# Patient Record
Sex: FEMALE | Race: WHITE | NOT HISPANIC OR LATINO | Employment: OTHER | URBAN - METROPOLITAN AREA
[De-identification: names, ages, dates, MRNs, and addresses within clinical notes are randomized per-mention and may not be internally consistent; named-entity substitution may affect disease eponyms.]

---

## 2017-01-05 ENCOUNTER — COMMUNICATION - HEALTHEAST (OUTPATIENT)
Dept: ONCOLOGY | Facility: HOSPITAL | Age: 72
End: 2017-01-05

## 2017-01-09 ENCOUNTER — OFFICE VISIT - HEALTHEAST (OUTPATIENT)
Dept: RADIATION ONCOLOGY | Facility: CLINIC | Age: 72
End: 2017-01-09

## 2017-01-09 DIAGNOSIS — C50.119: ICD-10-CM

## 2017-01-09 ASSESSMENT — MIFFLIN-ST. JEOR: SCORE: 1105.28

## 2017-01-12 ENCOUNTER — COMMUNICATION - HEALTHEAST (OUTPATIENT)
Dept: SURGERY | Facility: CLINIC | Age: 72
End: 2017-01-12

## 2017-01-12 ENCOUNTER — OFFICE VISIT - HEALTHEAST (OUTPATIENT)
Dept: ONCOLOGY | Facility: HOSPITAL | Age: 72
End: 2017-01-12

## 2017-01-12 DIAGNOSIS — C50.119: ICD-10-CM

## 2017-01-12 DIAGNOSIS — M94.9 DISORDER OF BONE AND CARTILAGE: ICD-10-CM

## 2017-01-12 DIAGNOSIS — M89.9 DISORDER OF BONE AND CARTILAGE: ICD-10-CM

## 2017-01-13 ENCOUNTER — COMMUNICATION - HEALTHEAST (OUTPATIENT)
Dept: ONCOLOGY | Facility: HOSPITAL | Age: 72
End: 2017-01-13

## 2017-01-16 ENCOUNTER — OFFICE VISIT - HEALTHEAST (OUTPATIENT)
Dept: RADIATION ONCOLOGY | Facility: CLINIC | Age: 72
End: 2017-01-16

## 2017-01-16 DIAGNOSIS — C50.119: ICD-10-CM

## 2017-01-16 ASSESSMENT — MIFFLIN-ST. JEOR: SCORE: 1103.02

## 2017-01-19 ENCOUNTER — RECORDS - HEALTHEAST (OUTPATIENT)
Dept: ADMINISTRATIVE | Facility: OTHER | Age: 72
End: 2017-01-19

## 2017-01-19 ENCOUNTER — RECORDS - HEALTHEAST (OUTPATIENT)
Dept: BONE DENSITY | Facility: CLINIC | Age: 72
End: 2017-01-19

## 2017-01-19 DIAGNOSIS — M94.9 DISORDER OF CARTILAGE, UNSPECIFIED: ICD-10-CM

## 2017-01-19 DIAGNOSIS — M89.9 DISORDER OF BONE, UNSPECIFIED: ICD-10-CM

## 2017-01-19 DIAGNOSIS — C50.119: ICD-10-CM

## 2017-01-23 ENCOUNTER — OFFICE VISIT - HEALTHEAST (OUTPATIENT)
Dept: RADIATION ONCOLOGY | Facility: CLINIC | Age: 72
End: 2017-01-23

## 2017-01-23 DIAGNOSIS — B02.9 HERPES ZOSTER WITHOUT COMPLICATION: ICD-10-CM

## 2017-01-23 DIAGNOSIS — C50.119: ICD-10-CM

## 2017-01-23 ASSESSMENT — MIFFLIN-ST. JEOR: SCORE: 1098.03

## 2017-01-24 ENCOUNTER — COMMUNICATION - HEALTHEAST (OUTPATIENT)
Dept: ONCOLOGY | Facility: HOSPITAL | Age: 72
End: 2017-01-24

## 2017-01-26 ENCOUNTER — AMBULATORY - HEALTHEAST (OUTPATIENT)
Dept: RADIATION ONCOLOGY | Facility: CLINIC | Age: 72
End: 2017-01-26

## 2017-01-30 ENCOUNTER — OFFICE VISIT - HEALTHEAST (OUTPATIENT)
Dept: RADIATION ONCOLOGY | Facility: CLINIC | Age: 72
End: 2017-01-30

## 2017-01-30 DIAGNOSIS — C50.119: ICD-10-CM

## 2017-01-30 ASSESSMENT — MIFFLIN-ST. JEOR: SCORE: 1100.29

## 2017-02-03 ENCOUNTER — COMMUNICATION - HEALTHEAST (OUTPATIENT)
Dept: ONCOLOGY | Facility: HOSPITAL | Age: 72
End: 2017-02-03

## 2017-02-06 ENCOUNTER — OFFICE VISIT - HEALTHEAST (OUTPATIENT)
Dept: RADIATION ONCOLOGY | Facility: CLINIC | Age: 72
End: 2017-02-06

## 2017-02-06 DIAGNOSIS — C50.119: ICD-10-CM

## 2017-02-07 ENCOUNTER — COMMUNICATION - HEALTHEAST (OUTPATIENT)
Dept: INFUSION THERAPY | Facility: HOSPITAL | Age: 72
End: 2017-02-07

## 2017-02-13 ENCOUNTER — OFFICE VISIT - HEALTHEAST (OUTPATIENT)
Dept: RADIATION ONCOLOGY | Facility: CLINIC | Age: 72
End: 2017-02-13

## 2017-02-13 DIAGNOSIS — C50.119: ICD-10-CM

## 2017-02-13 ASSESSMENT — MIFFLIN-ST. JEOR: SCORE: 1105.74

## 2017-02-17 ENCOUNTER — AMBULATORY - HEALTHEAST (OUTPATIENT)
Dept: RADIATION ONCOLOGY | Facility: CLINIC | Age: 72
End: 2017-02-17

## 2017-02-28 ENCOUNTER — COMMUNICATION - HEALTHEAST (OUTPATIENT)
Dept: RADIATION ONCOLOGY | Facility: CLINIC | Age: 72
End: 2017-02-28

## 2017-03-13 ENCOUNTER — OFFICE VISIT - HEALTHEAST (OUTPATIENT)
Dept: RADIATION ONCOLOGY | Facility: CLINIC | Age: 72
End: 2017-03-13

## 2017-03-13 ENCOUNTER — TRANSFERRED RECORDS (OUTPATIENT)
Dept: HEALTH INFORMATION MANAGEMENT | Facility: CLINIC | Age: 72
End: 2017-03-13

## 2017-03-13 DIAGNOSIS — C50.119: ICD-10-CM

## 2017-03-13 RX ORDER — TURMERIC 95 %
1000 POWDER (GRAM) MISCELLANEOUS DAILY
Status: SHIPPED | COMMUNITY
Start: 2017-03-13

## 2017-03-14 ENCOUNTER — AMBULATORY - HEALTHEAST (OUTPATIENT)
Dept: RADIATION ONCOLOGY | Facility: CLINIC | Age: 72
End: 2017-03-14

## 2017-03-14 DIAGNOSIS — C50.119: ICD-10-CM

## 2017-03-16 ENCOUNTER — AMBULATORY - HEALTHEAST (OUTPATIENT)
Dept: INFUSION THERAPY | Facility: HOSPITAL | Age: 72
End: 2017-03-16

## 2017-03-16 ENCOUNTER — OFFICE VISIT - HEALTHEAST (OUTPATIENT)
Dept: ONCOLOGY | Facility: HOSPITAL | Age: 72
End: 2017-03-16

## 2017-03-16 DIAGNOSIS — C50.119: ICD-10-CM

## 2017-03-21 ENCOUNTER — COMMUNICATION - HEALTHEAST (OUTPATIENT)
Dept: ONCOLOGY | Facility: HOSPITAL | Age: 72
End: 2017-03-21

## 2017-03-23 ENCOUNTER — COMMUNICATION - HEALTHEAST (OUTPATIENT)
Dept: ONCOLOGY | Facility: CLINIC | Age: 72
End: 2017-03-23

## 2017-06-15 ENCOUNTER — AMBULATORY - HEALTHEAST (OUTPATIENT)
Dept: ONCOLOGY | Facility: HOSPITAL | Age: 72
End: 2017-06-15

## 2017-06-15 ENCOUNTER — OFFICE VISIT - HEALTHEAST (OUTPATIENT)
Dept: ONCOLOGY | Facility: HOSPITAL | Age: 72
End: 2017-06-15

## 2017-06-15 DIAGNOSIS — C50.119: ICD-10-CM

## 2017-06-15 DIAGNOSIS — M94.9 DISORDER OF BONE AND CARTILAGE: ICD-10-CM

## 2017-06-15 DIAGNOSIS — M89.9 DISORDER OF BONE AND CARTILAGE: ICD-10-CM

## 2017-07-05 ENCOUNTER — COMMUNICATION - HEALTHEAST (OUTPATIENT)
Dept: ONCOLOGY | Facility: CLINIC | Age: 72
End: 2017-07-05

## 2017-08-11 ENCOUNTER — COMMUNICATION - HEALTHEAST (OUTPATIENT)
Dept: ADMINISTRATIVE | Facility: HOSPITAL | Age: 72
End: 2017-08-11

## 2017-08-12 ENCOUNTER — AMBULATORY - RIVER FALLS (OUTPATIENT)
Dept: FAMILY MEDICINE | Facility: CLINIC | Age: 72
End: 2017-08-12

## 2017-08-13 LAB
CHOLEST SERPL-MCNC: 282 MG/DL (ref 125–200)
CHOLEST/HDLC SERPL: 2.4 {RATIO}
CREAT SERPL-MCNC: 0.73 MG/DL (ref 0.6–0.93)
GLUCOSE BLD-MCNC: 102 MG/DL (ref 65–99)
HBA1C MFR BLD: 5.6 %
HDLC SERPL-MCNC: 117 MG/DL
LDLC SERPL CALC-MCNC: 148 MG/DL
NONHDLC SERPL-MCNC: 165 MG/DL
TRIGL SERPL-MCNC: 84 MG/DL

## 2017-08-17 ENCOUNTER — OFFICE VISIT - RIVER FALLS (OUTPATIENT)
Dept: FAMILY MEDICINE | Facility: CLINIC | Age: 72
End: 2017-08-17

## 2017-08-17 ASSESSMENT — MIFFLIN-ST. JEOR: SCORE: 1114.12

## 2017-08-24 ENCOUNTER — AMBULATORY - RIVER FALLS (OUTPATIENT)
Dept: FAMILY MEDICINE | Facility: CLINIC | Age: 72
End: 2017-08-24

## 2017-09-11 ENCOUNTER — OFFICE VISIT - HEALTHEAST (OUTPATIENT)
Dept: ONCOLOGY | Facility: HOSPITAL | Age: 72
End: 2017-09-11

## 2017-09-11 ENCOUNTER — AMBULATORY - HEALTHEAST (OUTPATIENT)
Dept: INFUSION THERAPY | Facility: HOSPITAL | Age: 72
End: 2017-09-11

## 2017-09-11 DIAGNOSIS — M89.9 DISORDER OF BONE AND CARTILAGE: ICD-10-CM

## 2017-09-11 DIAGNOSIS — C50.119: ICD-10-CM

## 2017-09-11 DIAGNOSIS — M94.9 DISORDER OF BONE AND CARTILAGE: ICD-10-CM

## 2017-11-21 ENCOUNTER — HOSPITAL ENCOUNTER (OUTPATIENT)
Dept: MAMMOGRAPHY | Facility: CLINIC | Age: 72
Discharge: HOME | End: 2017-11-21
Attending: SPECIALIST

## 2017-11-21 DIAGNOSIS — Z85.3 PERSONAL HISTORY OF BREAST CANCER: ICD-10-CM

## 2017-12-26 ENCOUNTER — OFFICE VISIT - HEALTHEAST (OUTPATIENT)
Dept: SURGERY | Facility: CLINIC | Age: 72
End: 2017-12-26

## 2017-12-26 DIAGNOSIS — Z85.3 PERSONAL HISTORY OF BREAST CANCER: ICD-10-CM

## 2017-12-26 ASSESSMENT — MIFFLIN-ST. JEOR: SCORE: 1104.83

## 2018-01-11 ENCOUNTER — COMMUNICATION - HEALTHEAST (OUTPATIENT)
Dept: ONCOLOGY | Facility: HOSPITAL | Age: 73
End: 2018-01-11

## 2018-01-11 ENCOUNTER — OFFICE VISIT - HEALTHEAST (OUTPATIENT)
Dept: ONCOLOGY | Facility: HOSPITAL | Age: 73
End: 2018-01-11

## 2018-01-11 DIAGNOSIS — Z17.0 MALIGNANT NEOPLASM OF LOWER-INNER QUADRANT OF LEFT BREAST IN FEMALE, ESTROGEN RECEPTOR POSITIVE (H): ICD-10-CM

## 2018-01-11 DIAGNOSIS — C50.312 MALIGNANT NEOPLASM OF LOWER-INNER QUADRANT OF LEFT BREAST IN FEMALE, ESTROGEN RECEPTOR POSITIVE (H): ICD-10-CM

## 2018-02-03 ENCOUNTER — COMMUNICATION - HEALTHEAST (OUTPATIENT)
Dept: ONCOLOGY | Facility: HOSPITAL | Age: 73
End: 2018-02-03

## 2018-05-02 ENCOUNTER — OFFICE VISIT - HEALTHEAST (OUTPATIENT)
Dept: ONCOLOGY | Facility: HOSPITAL | Age: 73
End: 2018-05-02

## 2018-05-02 ENCOUNTER — AMBULATORY - HEALTHEAST (OUTPATIENT)
Dept: ONCOLOGY | Facility: HOSPITAL | Age: 73
End: 2018-05-02

## 2018-05-02 DIAGNOSIS — Z17.0 MALIGNANT NEOPLASM OF LOWER-INNER QUADRANT OF LEFT BREAST IN FEMALE, ESTROGEN RECEPTOR POSITIVE (H): ICD-10-CM

## 2018-05-02 DIAGNOSIS — C50.312 MALIGNANT NEOPLASM OF LOWER-INNER QUADRANT OF LEFT BREAST IN FEMALE, ESTROGEN RECEPTOR POSITIVE (H): ICD-10-CM

## 2018-05-02 DIAGNOSIS — M94.9 DISORDER OF BONE AND CARTILAGE: ICD-10-CM

## 2018-05-02 DIAGNOSIS — M89.9 DISORDER OF BONE AND CARTILAGE: ICD-10-CM

## 2018-05-22 ENCOUNTER — OFFICE VISIT - RIVER FALLS (OUTPATIENT)
Dept: FAMILY MEDICINE | Facility: CLINIC | Age: 73
End: 2018-05-22

## 2018-10-03 ENCOUNTER — OFFICE VISIT - HEALTHEAST (OUTPATIENT)
Dept: ONCOLOGY | Facility: HOSPITAL | Age: 73
End: 2018-10-03

## 2018-10-03 ENCOUNTER — AMBULATORY - HEALTHEAST (OUTPATIENT)
Dept: INFUSION THERAPY | Facility: HOSPITAL | Age: 73
End: 2018-10-03

## 2018-10-03 DIAGNOSIS — M89.9 DISORDER OF BONE AND CARTILAGE: ICD-10-CM

## 2018-10-03 DIAGNOSIS — Z17.0 MALIGNANT NEOPLASM OF LOWER-INNER QUADRANT OF LEFT BREAST IN FEMALE, ESTROGEN RECEPTOR POSITIVE (H): ICD-10-CM

## 2018-10-03 DIAGNOSIS — M94.9 DISORDER OF BONE AND CARTILAGE: ICD-10-CM

## 2018-10-03 DIAGNOSIS — C50.312 MALIGNANT NEOPLASM OF LOWER-INNER QUADRANT OF LEFT BREAST IN FEMALE, ESTROGEN RECEPTOR POSITIVE (H): ICD-10-CM

## 2018-10-03 DIAGNOSIS — Z79.811 LONG TERM CURRENT USE OF AROMATASE INHIBITOR: ICD-10-CM

## 2018-10-03 LAB
ALBUMIN SERPL-MCNC: 3.9 G/DL (ref 3.5–5)
ALP SERPL-CCNC: 83 U/L (ref 45–120)
ALT SERPL W P-5'-P-CCNC: 18 U/L (ref 0–45)
ANION GAP SERPL CALCULATED.3IONS-SCNC: 7 MMOL/L (ref 5–18)
AST SERPL W P-5'-P-CCNC: 22 U/L (ref 0–40)
BILIRUB SERPL-MCNC: 0.6 MG/DL (ref 0–1)
BUN SERPL-MCNC: 12 MG/DL (ref 8–28)
CALCIUM SERPL-MCNC: 9.6 MG/DL (ref 8.5–10.5)
CHLORIDE BLD-SCNC: 104 MMOL/L (ref 98–107)
CO2 SERPL-SCNC: 30 MMOL/L (ref 22–31)
CREAT SERPL-MCNC: 0.72 MG/DL (ref 0.6–1.1)
GFR SERPL CREATININE-BSD FRML MDRD: >60 ML/MIN/1.73M2
GLUCOSE BLD-MCNC: 99 MG/DL (ref 70–125)
POTASSIUM BLD-SCNC: 4.2 MMOL/L (ref 3.5–5)
PROT SERPL-MCNC: 6.5 G/DL (ref 6–8)
SODIUM SERPL-SCNC: 141 MMOL/L (ref 136–145)

## 2018-12-05 ENCOUNTER — HOSPITAL ENCOUNTER (OUTPATIENT)
Dept: MAMMOGRAPHY | Facility: CLINIC | Age: 73
Discharge: HOME | End: 2018-12-05
Attending: INTERNAL MEDICINE

## 2018-12-05 DIAGNOSIS — Z12.31 VISIT FOR SCREENING MAMMOGRAM: ICD-10-CM

## 2019-01-31 ENCOUNTER — COMMUNICATION - HEALTHEAST (OUTPATIENT)
Dept: ONCOLOGY | Facility: HOSPITAL | Age: 74
End: 2019-01-31

## 2019-03-13 ENCOUNTER — COMMUNICATION - HEALTHEAST (OUTPATIENT)
Dept: ONCOLOGY | Facility: HOSPITAL | Age: 74
End: 2019-03-13

## 2019-03-13 ENCOUNTER — COMMUNICATION - HEALTHEAST (OUTPATIENT)
Dept: ADMINISTRATIVE | Facility: HOSPITAL | Age: 74
End: 2019-03-13

## 2019-04-17 ENCOUNTER — OFFICE VISIT - HEALTHEAST (OUTPATIENT)
Dept: ONCOLOGY | Facility: HOSPITAL | Age: 74
End: 2019-04-17

## 2019-04-17 ENCOUNTER — AMBULATORY - HEALTHEAST (OUTPATIENT)
Dept: INFUSION THERAPY | Facility: HOSPITAL | Age: 74
End: 2019-04-17

## 2019-04-17 DIAGNOSIS — C50.312 MALIGNANT NEOPLASM OF LOWER-INNER QUADRANT OF LEFT BREAST IN FEMALE, ESTROGEN RECEPTOR POSITIVE (H): ICD-10-CM

## 2019-04-17 DIAGNOSIS — Z17.0 MALIGNANT NEOPLASM OF LOWER-INNER QUADRANT OF LEFT BREAST IN FEMALE, ESTROGEN RECEPTOR POSITIVE (H): ICD-10-CM

## 2019-04-17 LAB
ALBUMIN SERPL-MCNC: 4 G/DL (ref 3.5–5)
ALP SERPL-CCNC: 140 U/L (ref 45–120)
ALT SERPL W P-5'-P-CCNC: 16 U/L (ref 0–45)
ANION GAP SERPL CALCULATED.3IONS-SCNC: 8 MMOL/L (ref 5–18)
AST SERPL W P-5'-P-CCNC: 21 U/L (ref 0–40)
BASOPHILS # BLD AUTO: 0.1 THOU/UL (ref 0–0.2)
BASOPHILS NFR BLD AUTO: 1 % (ref 0–2)
BILIRUB SERPL-MCNC: 0.4 MG/DL (ref 0–1)
BUN SERPL-MCNC: 13 MG/DL (ref 8–28)
CALCIUM SERPL-MCNC: 9.7 MG/DL (ref 8.5–10.5)
CHLORIDE BLD-SCNC: 105 MMOL/L (ref 98–107)
CO2 SERPL-SCNC: 28 MMOL/L (ref 22–31)
CREAT SERPL-MCNC: 0.72 MG/DL (ref 0.6–1.1)
EOSINOPHIL # BLD AUTO: 0.2 THOU/UL (ref 0–0.4)
EOSINOPHIL NFR BLD AUTO: 3 % (ref 0–6)
ERYTHROCYTE [DISTWIDTH] IN BLOOD BY AUTOMATED COUNT: 12.4 % (ref 11–14.5)
GFR SERPL CREATININE-BSD FRML MDRD: >60 ML/MIN/1.73M2
GLUCOSE BLD-MCNC: 139 MG/DL (ref 70–125)
HCT VFR BLD AUTO: 41.8 % (ref 35–47)
HGB BLD-MCNC: 13.6 G/DL (ref 12–16)
LDH SERPL L TO P-CCNC: 174 U/L (ref 125–220)
LYMPHOCYTES # BLD AUTO: 1 THOU/UL (ref 0.8–4.4)
LYMPHOCYTES NFR BLD AUTO: 15 % (ref 20–40)
MCH RBC QN AUTO: 29.2 PG (ref 27–34)
MCHC RBC AUTO-ENTMCNC: 32.5 G/DL (ref 32–36)
MCV RBC AUTO: 90 FL (ref 80–100)
MONOCYTES # BLD AUTO: 0.5 THOU/UL (ref 0–0.9)
MONOCYTES NFR BLD AUTO: 8 % (ref 2–10)
NEUTROPHILS # BLD AUTO: 4.5 THOU/UL (ref 2–7.7)
NEUTROPHILS NFR BLD AUTO: 73 % (ref 50–70)
PLATELET # BLD AUTO: 245 THOU/UL (ref 140–440)
PMV BLD AUTO: 9 FL (ref 8.5–12.5)
POTASSIUM BLD-SCNC: 3.7 MMOL/L (ref 3.5–5)
PROT SERPL-MCNC: 6.7 G/DL (ref 6–8)
RBC # BLD AUTO: 4.65 MILL/UL (ref 3.8–5.4)
SODIUM SERPL-SCNC: 141 MMOL/L (ref 136–145)
WBC: 6.2 THOU/UL (ref 4–11)

## 2019-10-09 ENCOUNTER — OFFICE VISIT - HEALTHEAST (OUTPATIENT)
Dept: ONCOLOGY | Facility: HOSPITAL | Age: 74
End: 2019-10-09

## 2019-10-09 ENCOUNTER — AMBULATORY - HEALTHEAST (OUTPATIENT)
Dept: INFUSION THERAPY | Facility: HOSPITAL | Age: 74
End: 2019-10-09

## 2019-10-09 DIAGNOSIS — C50.312 MALIGNANT NEOPLASM OF LOWER-INNER QUADRANT OF LEFT BREAST IN FEMALE, ESTROGEN RECEPTOR POSITIVE (H): ICD-10-CM

## 2019-10-09 DIAGNOSIS — Z17.0 MALIGNANT NEOPLASM OF LOWER-INNER QUADRANT OF LEFT BREAST IN FEMALE, ESTROGEN RECEPTOR POSITIVE (H): ICD-10-CM

## 2019-10-09 LAB
ALBUMIN SERPL-MCNC: 4 G/DL (ref 3.5–5)
ALP SERPL-CCNC: 86 U/L (ref 45–120)
ALT SERPL W P-5'-P-CCNC: 19 U/L (ref 0–45)
ANION GAP SERPL CALCULATED.3IONS-SCNC: 8 MMOL/L (ref 5–18)
AST SERPL W P-5'-P-CCNC: 25 U/L (ref 0–40)
BILIRUB SERPL-MCNC: 0.5 MG/DL (ref 0–1)
BUN SERPL-MCNC: 10 MG/DL (ref 8–28)
CALCIUM SERPL-MCNC: 9.4 MG/DL (ref 8.5–10.5)
CHLORIDE BLD-SCNC: 104 MMOL/L (ref 98–107)
CO2 SERPL-SCNC: 29 MMOL/L (ref 22–31)
CREAT SERPL-MCNC: 0.68 MG/DL (ref 0.6–1.1)
GFR SERPL CREATININE-BSD FRML MDRD: >60 ML/MIN/1.73M2
GLUCOSE BLD-MCNC: 101 MG/DL (ref 70–125)
POTASSIUM BLD-SCNC: 4.2 MMOL/L (ref 3.5–5)
PROT SERPL-MCNC: 6.7 G/DL (ref 6–8)
SODIUM SERPL-SCNC: 141 MMOL/L (ref 136–145)

## 2020-01-28 ENCOUNTER — COMMUNICATION - HEALTHEAST (OUTPATIENT)
Dept: ONCOLOGY | Facility: HOSPITAL | Age: 75
End: 2020-01-28

## 2020-01-28 DIAGNOSIS — Z17.0 MALIGNANT NEOPLASM OF LOWER-INNER QUADRANT OF LEFT BREAST IN FEMALE, ESTROGEN RECEPTOR POSITIVE (H): ICD-10-CM

## 2020-01-28 DIAGNOSIS — C50.312 MALIGNANT NEOPLASM OF LOWER-INNER QUADRANT OF LEFT BREAST IN FEMALE, ESTROGEN RECEPTOR POSITIVE (H): ICD-10-CM

## 2020-01-30 ENCOUNTER — HOSPITAL ENCOUNTER (OUTPATIENT)
Dept: MAMMOGRAPHY | Facility: CLINIC | Age: 75
Discharge: HOME | End: 2020-01-30
Attending: INTERNAL MEDICINE

## 2020-01-30 ENCOUNTER — COMMUNICATION - HEALTHEAST (OUTPATIENT)
Dept: ONCOLOGY | Facility: HOSPITAL | Age: 75
End: 2020-01-30

## 2020-01-30 DIAGNOSIS — Z12.31 VISIT FOR SCREENING MAMMOGRAM: ICD-10-CM

## 2020-03-03 ENCOUNTER — TELEPHONE (OUTPATIENT)
Dept: OTOLARYNGOLOGY | Facility: CLINIC | Age: 75
End: 2020-03-03

## 2020-03-03 NOTE — TELEPHONE ENCOUNTER
Called patient and left a VM.    Informed her that she will be seeing Dr. Nissen instead of Rupa Edwards at 9/01 appointment (appointment time is still the same), as Rupa will no longer be with our clinic.    Also explained that, while Rupa does not require a referral - Dr. Nissen DOES, and that she will need to obtain one from her PCP prior to this appointment. Provided ENT fax #.    Also provided ENT call center number to call back if questions arise.

## 2020-03-12 ENCOUNTER — TELEPHONE (OUTPATIENT)
Dept: OTOLARYNGOLOGY | Facility: CLINIC | Age: 75
End: 2020-03-12

## 2020-03-12 NOTE — TELEPHONE ENCOUNTER
[facility] Call Center    Phone Message    May a detailed message be left on voicemail: yes     Reason for Call: Other: Patient called to see if referral was received from Dr. Mariajose Hebert at [facility].  It was faxed to [phone].  Please follow up with patient.  Thank you!     Action Taken: Message routed to:  [facility] P ENT [facility]    Travel Screening: Not Applicable

## 2020-03-12 NOTE — TELEPHONE ENCOUNTER
Informed patient that we have not received this referral. Advised her to contact referring clinic and ask them to resend the fax. Patient is in agreement with this plan.    Jes Rebolledo, EMT

## 2020-03-19 ENCOUNTER — TRANSCRIBE ORDERS (OUTPATIENT)
Dept: OTHER | Age: 75
End: 2020-03-19

## 2020-03-19 DIAGNOSIS — H91.90 HEARING LOSS, UNSPECIFIED HEARING LOSS TYPE, UNSPECIFIED LATERALITY: Primary | ICD-10-CM

## 2020-04-01 ENCOUNTER — COMMUNICATION - HEALTHEAST (OUTPATIENT)
Dept: ONCOLOGY | Facility: HOSPITAL | Age: 75
End: 2020-04-01

## 2020-04-06 ENCOUNTER — AMBULATORY - HEALTHEAST (OUTPATIENT)
Dept: INFUSION THERAPY | Facility: HOSPITAL | Age: 75
End: 2020-04-06

## 2020-04-06 DIAGNOSIS — Z17.0 MALIGNANT NEOPLASM OF LOWER-INNER QUADRANT OF LEFT BREAST IN FEMALE, ESTROGEN RECEPTOR POSITIVE (H): ICD-10-CM

## 2020-04-06 DIAGNOSIS — C50.312 MALIGNANT NEOPLASM OF LOWER-INNER QUADRANT OF LEFT BREAST IN FEMALE, ESTROGEN RECEPTOR POSITIVE (H): ICD-10-CM

## 2020-04-06 LAB
ALBUMIN SERPL-MCNC: 4 G/DL (ref 3.5–5)
ALP SERPL-CCNC: 70 U/L (ref 45–120)
ALT SERPL W P-5'-P-CCNC: 17 U/L (ref 0–45)
ANION GAP SERPL CALCULATED.3IONS-SCNC: 8 MMOL/L (ref 5–18)
AST SERPL W P-5'-P-CCNC: 23 U/L (ref 0–40)
BILIRUB SERPL-MCNC: 0.9 MG/DL (ref 0–1)
BUN SERPL-MCNC: 11 MG/DL (ref 8–28)
CALCIUM SERPL-MCNC: 9.5 MG/DL (ref 8.5–10.5)
CHLORIDE BLD-SCNC: 104 MMOL/L (ref 98–107)
CO2 SERPL-SCNC: 29 MMOL/L (ref 22–31)
CREAT SERPL-MCNC: 0.76 MG/DL (ref 0.6–1.1)
GFR SERPL CREATININE-BSD FRML MDRD: >60 ML/MIN/1.73M2
GLUCOSE BLD-MCNC: 93 MG/DL (ref 70–125)
POTASSIUM BLD-SCNC: 4.2 MMOL/L (ref 3.5–5)
PROT SERPL-MCNC: 6.7 G/DL (ref 6–8)
SODIUM SERPL-SCNC: 141 MMOL/L (ref 136–145)

## 2020-04-08 ENCOUNTER — OFFICE VISIT - HEALTHEAST (OUTPATIENT)
Dept: ONCOLOGY | Facility: HOSPITAL | Age: 75
End: 2020-04-08

## 2020-04-08 DIAGNOSIS — C50.312 MALIGNANT NEOPLASM OF LOWER-INNER QUADRANT OF LEFT BREAST IN FEMALE, ESTROGEN RECEPTOR POSITIVE (H): ICD-10-CM

## 2020-04-08 DIAGNOSIS — M89.9 DISORDER OF BONE AND CARTILAGE: ICD-10-CM

## 2020-04-08 DIAGNOSIS — Z17.0 MALIGNANT NEOPLASM OF LOWER-INNER QUADRANT OF LEFT BREAST IN FEMALE, ESTROGEN RECEPTOR POSITIVE (H): ICD-10-CM

## 2020-04-08 DIAGNOSIS — M94.9 DISORDER OF BONE AND CARTILAGE: ICD-10-CM

## 2020-07-14 ENCOUNTER — DOCUMENTATION ONLY (OUTPATIENT)
Dept: CARE COORDINATION | Facility: CLINIC | Age: 75
End: 2020-07-14

## 2020-08-12 NOTE — TELEPHONE ENCOUNTER
FUTURE VISIT INFORMATION      FUTURE VISIT INFORMATION:    Date: 9/1/20    Time: 10:30 AM; Audio 10 AM    Location: [facility]  REFERRAL INFORMATION:    Referring provider:  Dr. Mariajose Hebert    Referring providers clinic:  [facility]    Reason for visit/diagnosis: Ear Cleaning    RECORDS REQUESTED FROM:       Clinic name Comments Records Status Imaging Status   [facility] 3/20/20 - [facility] TV with Dr. Hebert  1/13/20 - [facility] OV with Dr. Suggs [facility] Everywhere

## 2020-08-26 ENCOUNTER — TELEPHONE (OUTPATIENT)
Dept: OTOLARYNGOLOGY | Facility: CLINIC | Age: 75
End: 2020-08-26

## 2020-08-26 NOTE — TELEPHONE ENCOUNTER
LVM to reschedule to video visit,      If pt would like to do telephone visit for 9/1 appt please confirm best number for contact. If pt would like video visit please confirm e-mail and sign them up for mychart as we will sent set up instructions though that.     Dr. Nissen will not be back into clinic until after 9/8

## 2020-09-01 ENCOUNTER — PRE VISIT (OUTPATIENT)
Dept: OTOLARYNGOLOGY | Facility: CLINIC | Age: 75
End: 2020-09-01

## 2021-02-15 ENCOUNTER — OFFICE VISIT - HEALTHEAST (OUTPATIENT)
Dept: ONCOLOGY | Facility: HOSPITAL | Age: 76
End: 2021-02-15

## 2021-02-15 ENCOUNTER — COMMUNICATION - HEALTHEAST (OUTPATIENT)
Dept: ONCOLOGY | Facility: HOSPITAL | Age: 76
End: 2021-02-15

## 2021-02-15 DIAGNOSIS — Z17.0 MALIGNANT NEOPLASM OF LOWER-INNER QUADRANT OF LEFT BREAST IN FEMALE, ESTROGEN RECEPTOR POSITIVE (H): ICD-10-CM

## 2021-02-15 DIAGNOSIS — Z79.811 AROMATASE INHIBITOR USE: ICD-10-CM

## 2021-02-15 DIAGNOSIS — Z78.0 ASYMPTOMATIC MENOPAUSAL STATE: ICD-10-CM

## 2021-02-15 DIAGNOSIS — M94.9 DISORDER OF BONE AND CARTILAGE: ICD-10-CM

## 2021-02-15 DIAGNOSIS — M89.9 DISORDER OF BONE AND CARTILAGE: ICD-10-CM

## 2021-02-15 DIAGNOSIS — C50.312 MALIGNANT NEOPLASM OF LOWER-INNER QUADRANT OF LEFT BREAST IN FEMALE, ESTROGEN RECEPTOR POSITIVE (H): ICD-10-CM

## 2021-02-15 RX ORDER — ANASTROZOLE 1 MG/1
1 TABLET ORAL DAILY
Qty: 90 TABLET | Refills: 3 | Status: SHIPPED | OUTPATIENT
Start: 2021-02-15

## 2021-03-12 ENCOUNTER — RECORDS - HEALTHEAST (OUTPATIENT)
Dept: BONE DENSITY | Facility: CLINIC | Age: 76
End: 2021-03-12

## 2021-03-12 ENCOUNTER — RECORDS - HEALTHEAST (OUTPATIENT)
Dept: ADMINISTRATIVE | Facility: OTHER | Age: 76
End: 2021-03-12

## 2021-03-12 ENCOUNTER — HOSPITAL ENCOUNTER (OUTPATIENT)
Dept: MAMMOGRAPHY | Facility: CLINIC | Age: 76
Discharge: HOME | End: 2021-03-12

## 2021-03-12 DIAGNOSIS — Z78.0 ASYMPTOMATIC MENOPAUSAL STATE: ICD-10-CM

## 2021-03-12 DIAGNOSIS — C50.312 MALIGNANT NEOPLASM OF LOWER-INNER QUADRANT OF LEFT FEMALE BREAST (H): ICD-10-CM

## 2021-03-12 DIAGNOSIS — M89.9 DISORDER OF BONE, UNSPECIFIED: ICD-10-CM

## 2021-03-12 DIAGNOSIS — M94.9 DISORDER OF CARTILAGE, UNSPECIFIED: ICD-10-CM

## 2021-03-12 DIAGNOSIS — Z17.0 ESTROGEN RECEPTOR POSITIVE STATUS (ER+): ICD-10-CM

## 2021-03-12 DIAGNOSIS — Z12.31 VISIT FOR SCREENING MAMMOGRAM: ICD-10-CM

## 2021-03-12 DIAGNOSIS — Z79.811 LONG TERM (CURRENT) USE OF AROMATASE INHIBITORS: ICD-10-CM

## 2021-03-16 ENCOUNTER — COMMUNICATION - HEALTHEAST (OUTPATIENT)
Dept: ONCOLOGY | Facility: HOSPITAL | Age: 76
End: 2021-03-16

## 2021-03-18 ENCOUNTER — COMMUNICATION - HEALTHEAST (OUTPATIENT)
Dept: ONCOLOGY | Facility: HOSPITAL | Age: 76
End: 2021-03-18

## 2021-04-05 ENCOUNTER — COMMUNICATION - HEALTHEAST (OUTPATIENT)
Dept: ONCOLOGY | Facility: HOSPITAL | Age: 76
End: 2021-04-05

## 2021-05-25 ENCOUNTER — RECORDS - HEALTHEAST (OUTPATIENT)
Dept: ADMINISTRATIVE | Facility: CLINIC | Age: 76
End: 2021-05-25

## 2021-05-26 ENCOUNTER — RECORDS - HEALTHEAST (OUTPATIENT)
Dept: ADMINISTRATIVE | Facility: CLINIC | Age: 76
End: 2021-05-26

## 2021-05-27 ENCOUNTER — RECORDS - HEALTHEAST (OUTPATIENT)
Dept: ADMINISTRATIVE | Facility: CLINIC | Age: 76
End: 2021-05-27

## 2021-05-27 NOTE — PROGRESS NOTES
[facility] Hematology/Oncology Inpatient progress note  Patient: Debra Mckeon  MRN: [id number]  Date of Service: 2019       Chief complaint      1. Malignant neoplasm of lower-inner quadrant of left breast in female, estrogen receptor positive (H)          Assessment     1. CA breasts left-sided stage II, T2 N1 M0 ER positive [place] positive HER-2/khari negative by IHC. Negative margins. One sentinel lymph node positive out of 2. This is a grade 1 tumor. Oncotype score of 6. Finished radiation and started Anastrozole in 2017. Tolerating it well.  2. Osteopenia on DEXA. She is on vit D and calcium.  3. Her mother was diagnosed with breast cancer at the same age and  from metastatic breast cancer a few years later. This has been her worry.  4. Use of some alternative medications for health promotion in preservation.  5. History fracture of right 8th rib after a fall.  6. Recently had kidney stone in 2018  7. Slightly elevated Alk Phos.    Plan     1. Continue Anastrozole.  Follow-up with me in 6 months.  2. Continue with good diet and exercise.  3. Increase calcium but mostly in diet and Vit d in her diet.  4. Arm sleeve when travelling.  5. Mammogram in December this year.  6. Bone density to be rechecked in 2021.    HPI    Debra Mckeon is a 74 y.o. old female breast cancer located in her left breast measuring 2.2 cm in size presenting on the CT scan that was done to look for pulmonary embolism back in 2016 when she presented with pleuritic chest pain. She was then seen by Dr. Larios who ordered an Onco blot test which apparently was positive. She then had another mammogram in 2016 which was also suggestive of malignancy in the left breast. She underwent biopsy on 2016 which confirmed breast cancer ER positive AZ positive HER-2/khari negative. Subsequent to that she was seen by Dr. Powers who performed lumpectomy with sentinel lymph node biopsy on  December 8, 2016 which showed invasive ductal carcinoma 2.2 cm in size negative margins with one of the 2 sentinel lymph nodes positive for metastatic cancer the size in the lymph node was 9 mm. She recovered well from surgery.     We did Oncotype and it came back low at 6. So she has proceeded with radiation that was finished in Feb 17, 2017. Started on Anastrozole in February 2017.  She is handling it well.  She did have a bone density done which showed low bone mass/osteopenia.    Here for scheduled f/u.  Had kidney stone in December 2018.    Review of system      Detailed pertinent 8 system review of systems was done.  Findings noted.      Past Medical, family and social history     Medical History  Active Ambulatory (Non-Hospital) Problems    Diagnosis     Herpes zoster without complication     Malignant neoplasm of lower-inner quadrant of left breast in female, estrogen receptor positive (H)     Osteopenia     Menopause Has Occurred     Past Medical History:   Diagnosis Date     Breast cancer (H) 2016     Celiac disease 2008     Disease of thyroid gland      Hx of radiation therapy 01/2017     Hypothyroid      Melanoma (H) 2012     Shingles      Squamous cell skin cancer, wrist, left 2015       Physical exam      Vitals:    04/17/19 1341   BP: 114/73   Pulse: 83   Temp: 97.6  F (36.4  C)   SpO2: 97%       GENERAL: No acute distress. Cooperative in conversation.   HEENT: Pupils are equal, round and reactive. Oral mucosa is clean and intact. No ulcerations or mucositis noted. No bleeding noted.  RESP:Chest symmetric lungs are clear bilaterally per auscultation. Regular respiratory rate. No wheezes or rhonchi.  CV: Normal S1 S2 Regular, rate and rhythm. No murmurs.  ABD: Nondistended, soft, nontender. Positive bowel sounds. No organomegaly.   EXTREMITIES: No lower extremity edema.    NEURO: Non- focal. Alert and oriented x3.  Cranial nerves appear intact.  PSYCH: Within normal limits. No depression or  anxiety.  SKIN: Warm dry intact.    LYMPH NODES: Bilateral cervical, supraclavicular, axillary lymph node examination was done.  Negative for any palpable adenopathy.  BREAST: b/l breast exam was done. No palpable lump. Left breast has well healed scar and some radiation changes.       Labs and radiology      Recent Results (from the past 24 hour(s))   Comprehensive Metabolic Panel   Result Value Ref Range    Sodium 141 136 - 145 mmol/L    Potassium 3.7 3.5 - 5.0 mmol/L    Chloride 105 98 - 107 mmol/L    CO2 28 22 - 31 mmol/L    Anion Gap, Calculation 8 5 - 18 mmol/L    Glucose 139 (H) 70 - 125 mg/dL    BUN 13 8 - 28 mg/dL    Creatinine 0.72 0.60 - 1.10 mg/dL    GFR MDRD Af Amer >60 >60 mL/min/1.73m2    GFR MDRD Non Af Amer >60 >60 mL/min/1.73m2    Bilirubin, Total 0.4 0.0 - 1.0 mg/dL    Calcium 9.7 8.5 - 10.5 mg/dL    Protein, Total 6.7 6.0 - 8.0 g/dL    Albumin 4.0 3.5 - 5.0 g/dL    Alkaline Phosphatase 140 (H) 45 - 120 U/L    AST 21 0 - 40 U/L    ALT 16 0 - 45 U/L   HM1 (CBC with Diff)   Result Value Ref Range    WBC 6.2 4.0 - 11.0 thou/uL    RBC 4.65 3.80 - 5.40 mill/uL    Hemoglobin 13.6 12.0 - 16.0 g/dL    Hematocrit 41.8 35.0 - 47.0 %    MCV 90 80 - 100 fL    MCH 29.2 27.0 - 34.0 pg    MCHC 32.5 32.0 - 36.0 g/dL    RDW 12.4 11.0 - 14.5 %    Platelets 245 140 - 440 thou/uL    MPV 9.0 8.5 - 12.5 fL    Neutrophils % 73 (H) 50 - 70 %    Lymphocytes % 15 (L) 20 - 40 %    Monocytes % 8 2 - 10 %    Eosinophils % 3 0 - 6 %    Basophils % 1 0 - 2 %    Neutrophils Absolute 4.5 2.0 - 7.7 thou/uL    Lymphocytes Absolute 1.0 0.8 - 4.4 thou/uL    Monocytes Absolute 0.5 0.0 - 0.9 thou/uL    Eosinophils Absolute 0.2 0.0 - 0.4 thou/uL    Basophils Absolute 0.1 0.0 - 0.2 thou/uL       [facility] MEDICAL IMAGING  CT ABDOMEN PELVIS STONE PROTOCOL WO  12/17/2018 8:51 AM    INDICATION: Left Flank Pain  TECHNIQUE: Routine CT abdomen and pelvis without oral or IV contrast.  Multiplanar reformation images (MPR). Dose reduction techniques  were used.  COMPARISON: None.    FINDINGS:   LUNG BASES: Atelectasis in the left lower lobe.    ABDOMEN: The noncontrast liver, gallbladder, spleen, bilateral adrenal glands,  and pancreas are unremarkable. There is an obstructing left ureterovesicular  stone resulting in mild left hydroureteronephrosis and left perinephric  inflammation. No residual renal calculi evident bilaterally. No right-sided  hydronephrosis. The right ureter is unremarkable. Normal caliber aorta. The  stomach and small bowel are normal. No ascites.    PELVIS: Normal appendix. Bladder is unremarkable. Retroverted uterus. Trace free  fluid in the pelvis. The colon is unremarkable.    MUSCULOSKELETAL: Degenerative changes spine mostly at the L5-S1 level.    CONCLUSION:   1.  Mild left hydroureteronephrosis and perinephric inflammation secondary to a  3 mm stone at the left ureterovesicular junction.  2.  No additional urinary calculi evident.  3.  Trace free fluid in the pelvis.    No results found.      Caden King MD

## 2021-05-28 ENCOUNTER — RECORDS - HEALTHEAST (OUTPATIENT)
Dept: ADMINISTRATIVE | Facility: CLINIC | Age: 76
End: 2021-05-28

## 2021-05-29 ENCOUNTER — RECORDS - HEALTHEAST (OUTPATIENT)
Dept: ADMINISTRATIVE | Facility: CLINIC | Age: 76
End: 2021-05-29

## 2021-05-30 VITALS — BODY MASS INDEX: 20.28 KG/M2 | WEIGHT: 129.2 LBS | HEIGHT: 67 IN

## 2021-05-30 VITALS — BODY MASS INDEX: 20.26 KG/M2 | HEIGHT: 67 IN | WEIGHT: 129.1 LBS

## 2021-05-30 VITALS — BODY MASS INDEX: 20.32 KG/M2 | WEIGHT: 127.8 LBS

## 2021-05-30 VITALS — BODY MASS INDEX: 20.18 KG/M2 | HEIGHT: 67 IN | WEIGHT: 128.6 LBS

## 2021-05-30 VITALS — BODY MASS INDEX: 20.78 KG/M2 | WEIGHT: 130.7 LBS

## 2021-05-30 VITALS — BODY MASS INDEX: 20.09 KG/M2 | HEIGHT: 67 IN | WEIGHT: 128 LBS

## 2021-05-30 VITALS — BODY MASS INDEX: 20.56 KG/M2 | WEIGHT: 129.3 LBS

## 2021-05-30 VITALS — BODY MASS INDEX: 20.59 KG/M2 | WEIGHT: 129.5 LBS

## 2021-05-30 VITALS — BODY MASS INDEX: 20.01 KG/M2 | WEIGHT: 127.5 LBS | HEIGHT: 67 IN

## 2021-05-31 VITALS — BODY MASS INDEX: 20.25 KG/M2 | HEIGHT: 67 IN | WEIGHT: 129 LBS

## 2021-05-31 VITALS — BODY MASS INDEX: 20.59 KG/M2 | WEIGHT: 129.5 LBS

## 2021-05-31 VITALS — WEIGHT: 129.3 LBS | BODY MASS INDEX: 20.56 KG/M2

## 2021-05-31 VITALS — WEIGHT: 124.6 LBS | BODY MASS INDEX: 19.81 KG/M2

## 2021-06-01 VITALS — WEIGHT: 127.4 LBS | BODY MASS INDEX: 20.25 KG/M2

## 2021-06-02 VITALS — BODY MASS INDEX: 20.67 KG/M2 | WEIGHT: 130 LBS

## 2021-06-02 VITALS — WEIGHT: 133.9 LBS | BODY MASS INDEX: 21.29 KG/M2

## 2021-06-02 NOTE — PROGRESS NOTES
Pt arrived at [facility] to see Dr. King. Pt has Breast Cancer and is here for f/u.

## 2021-06-02 NOTE — PROGRESS NOTES
[facility] Hematology/Oncology Inpatient progress note  Patient: Debra Mckeon  MRN: [id number]  Date of Service: 10/9/2019       Chief complaint      1. Malignant neoplasm of lower-inner quadrant of left breast in female, estrogen receptor positive (H)          Assessment     1. CA breasts left-sided stage II, T2 N1 M0 ER positive [place] positive HER-2/khari negative by IHC. Negative margins. One sentinel lymph node positive out of 2. This is a grade 1 tumor. Oncotype score of 6. Finished radiation and started Anastrozole in 2017. Tolerating it well.  2. Osteopenia on DEXA. She is on vit D and calcium.  3. Her mother was diagnosed with breast cancer at the same age and  from metastatic breast cancer a few years later. This has been her worry.  4. Use of some alternative medications for health promotion in preservation.  5. History fracture of right 8th rib after a fall.  6. History of  kidney stone in 2018  7. Slightly elevated Alk Phos.    Plan     1. Continue Anastrozole.  Follow-up with me in 6 months.  2. Continue with good diet and exercise.  3. High but not too high calcium but mostly in diet and Vit d in her diet.  4. Arm sleeve when travelling.  5. Mammogram in December this year.  6. Bone density to be rechecked in 2021.    HPI    Debra Mckeon is a 74 y.o. old female breast cancer located in her left breast measuring 2.2 cm in size presenting on the CT scan that was done to look for pulmonary embolism back in 2016 when she presented with pleuritic chest pain. She was then seen by Dr. Larios who ordered an Onco blot test which apparently was positive. She then had another mammogram in 2016 which was also suggestive of malignancy in the left breast. She underwent biopsy on 2016 which confirmed breast cancer ER positive [place] positive HER-2/khari negative. Subsequent to that she was seen by Dr. Powers who performed lumpectomy with sentinel lymph node  biopsy on December 8, 2016 which showed invasive ductal carcinoma 2.2 cm in size negative margins with one of the 2 sentinel lymph nodes positive for metastatic cancer the size in the lymph node was 9 mm. She recovered well from surgery.     We did Oncotype and it came back low at 6. So she has proceeded with radiation that was finished in Feb 17, 2017. Started on Anastrozole in February 2017.  She is handling it well.  She did have a bone density done which showed low bone mass/osteopenia.    Here for scheduled f/u.  Had kidney stone in December 2018. She is doing fine. No bone pain. No joint issues. Taking her medication without problems.    Review of system      Detailed pertinent 8 system review of systems was done.  Findings noted.      Past Medical, family and social history     Medical History  Active Ambulatory (Non-Hospital) Problems    Diagnosis     Herpes zoster without complication     Malignant neoplasm of lower-inner quadrant of left breast in female, estrogen receptor positive (H)     Osteopenia     Menopause Has Occurred     Past Medical History:   Diagnosis Date     Breast cancer (H) 2016     Celiac disease 2008     Disease of thyroid gland      Hx of radiation therapy 01/2017     Hypothyroid      Melanoma (H) 2012     Shingles      Squamous cell skin cancer, wrist, left 2015       Physical exam      Vitals:    10/09/19 1339   BP:    Pulse:    Temp: 97.5  F (36.4  C)   SpO2:        GENERAL: No acute distress. Cooperative in conversation.   HEENT: Pupils are equal, round and reactive. Oral mucosa is clean and intact. No ulcerations or mucositis noted. No bleeding noted.  RESP:Chest symmetric lungs are clear bilaterally per auscultation. Regular respiratory rate. No wheezes or rhonchi.  CV: Normal S1 S2 Regular, rate and rhythm. No murmurs.  ABD: Nondistended, soft, nontender. Positive bowel sounds. No organomegaly.   EXTREMITIES: No lower extremity edema.    NEURO: Non- focal. Alert and oriented x3.   Cranial nerves appear intact.  PSYCH: Within normal limits. No depression or anxiety.  SKIN: Warm dry intact.    LYMPH NODES: Bilateral cervical, supraclavicular, axillary lymph node examination was done.  Negative for any palpable adenopathy.  BREAST: b/l breast exam was done. No palpable lump. Left breast has well healed scar and some radiation changes.       Labs and radiology      Recent Results (from the past 24 hour(s))   Comprehensive Metabolic Panel   Result Value Ref Range    Sodium 141 136 - 145 mmol/L    Potassium 4.2 3.5 - 5.0 mmol/L    Chloride 104 98 - 107 mmol/L    CO2 29 22 - 31 mmol/L    Anion Gap, Calculation 8 5 - 18 mmol/L    Glucose 101 70 - 125 mg/dL    BUN 10 8 - 28 mg/dL    Creatinine 0.68 0.60 - 1.10 mg/dL    GFR MDRD Af Amer >60 >60 mL/min/1.73m2    GFR MDRD Non Af Amer >60 >60 mL/min/1.73m2    Bilirubin, Total 0.5 0.0 - 1.0 mg/dL    Calcium 9.4 8.5 - 10.5 mg/dL    Protein, Total 6.7 6.0 - 8.0 g/dL    Albumin 4.0 3.5 - 5.0 g/dL    Alkaline Phosphatase 86 45 - 120 U/L    AST 25 0 - 40 U/L    ALT 19 0 - 45 U/L           No results found.      Caden King MD

## 2021-06-03 VITALS
DIASTOLIC BLOOD PRESSURE: 60 MMHG | TEMPERATURE: 97.5 F | SYSTOLIC BLOOD PRESSURE: 111 MMHG | OXYGEN SATURATION: 99 % | BODY MASS INDEX: 20.83 KG/M2 | WEIGHT: 131 LBS | HEART RATE: 72 BPM

## 2021-06-05 NOTE — TELEPHONE ENCOUNTER
Debra telephoned requesting resources for her spouse and I gave her the information for [facility] clinic in [place] and also [facility] in [place].  Debra is having her mammogram today.    She has been enjoying domestic travel with her  since we last talked.  He has tinnitis and they travel together when possible.  She does have a trip to [place] scheduled this spring.  Traveling continues to inspire her. Macy Beck RN

## 2021-06-08 NOTE — PROGRESS NOTES
Patient here ambulatory accompanied by  for daily radiation.  States she developed a small area in the right middle back that is giving her nerve pain.  States this has spread and she feels it may be shingles.  Patient has had shingles in the past.  Dr. Escobar informed.

## 2021-06-08 NOTE — PROGRESS NOTES
"  RADIATION ONCOLOGY WEEKLY TREATMENT VISIT NOTE        Assessment / Impression       1. Primary cancer of central portion of Left breast        Primary cancer of central portion of Left breast     Staging form: Breast, AJCC 7th Edition      Clinical stage from 12/15/2016: Stage IIB (T2, N1, M0) - Signed by Caden King MD on 12/15/2016    Tolerating radiation therapy well.  All questions and concerns addressed.      Plan:     Continue radiation treatment as prescribed.  Radiation: Site: Left Breast  Stereotactic Radiosurgery: No  Stereotactic Radiosurgery: No  Concurrent Therapy: No  Today's Dose: 3200  Total Dose for Breast: 6000  Today's Fraction/Total Fraction Breast: 16/30      Subjective:      HPI: Debra Mckeon is a 71 y.o. female with    1. Primary cancer of central portion of Left breast          The following portions of the patient's history were reviewed and updated as appropriate: allergies, current medications, past family history, past medical history, past social history, past surgical history and problem list.      Objective:    Exam:     General: Alert and oriented, in no acute distress  Vitals:    01/30/17 1040   BP: 118/69   Pulse: 62   Temp: 97.7  F (36.5  C)   TempSrc: Oral   SpO2: 99%   Weight: 128 lb (58.1 kg)   Height: 5' 6.5\" (1.689 m)     Debra has mild Erythema.    Labs:  Results for orders placed or performed during the hospital encounter of 11/18/16   Surgical Pathology Exam   Result Value Ref Range    Case Report       Surgical Pathology                                Case: [id number]                                   Authorizing Provider:  Sina Larios,   Collected:           11/18/2016 Vanda MARY                                                                           Ordering Location:     [facility]    Received:            11/18/2016 1115                                     Ultrasound                                   "                                 Pathologist:           Dominik Mcdaniel MD PhD                                                        Specimen:    Breast, Left, left breast 900 zone 1                                                       Addendum       IMMUNOCHEMISTRY RESULT:    - ESTROGEN RECEPTOR:                             POSITIVE (GREATER THAN 90% OF TUMOR NUCLEI, STRONG)    - PROGESTERONE RECEPTOR:                  POSITIVE  (GREATER THAN 90% OF TUMOR NUCLEI,STRONG)    - HER2:                                                             NEGATIVE (SCORE 0)    IMMUNOCHEMISTRY ALSO REVIEWED BY DR. POWERS     CPT: 88360 x3  PQRS: 3394F, 3395F    Note - Estrogen and Progesterone Receptor Immunoperoxidase Stains:  These stains were done using the following criteria:    Specimen fixative: Formalin-fixed paraffin-embedded sections    Detection system: Biotin-free multimer-based technology detection system ([employer])    Retrieval method: CC1 pretreatment; a adenike-based buffer with a slightly basic PH used at an elevated     temperature (95+5 degrees C)    Clone:  Estrogen receptor-SP1, Rabbit monoclonal (Naplate)     Progesterone receptor-1E2, Rabbit monoclonal ([employer])    Scoring method: Intensity of nuclear stain, strong vs weak vs  absent; % of cells stained     Note - HER-2/khari Immunoperoxidase Stain:  This stain was done using the following criteria:    Specimen fixative: Formalin-fixed paraffin-embedded sections    Detection system: Biotin-free multimer-based technology detection system ([employer])    Retrieval method: CC1 pretreatment; a adenike-based buffer with a slightly basic PH used at an elevated     temperature (95 plus or minus 5 degrees C)    Clone:  4B5, Rabbit monoclonal (Naplate Pathway)    Scoring method: IHC 3+ - Positive (Circumferential membrane staining that is complete, intense, and     within greater than 10% of tumor cells)      IHC 2+ - Equivocal    (Circumferential Membrane staining that is  "incomplete and/or weak/moderate and within greater than 10% of    tumor cells or complete and circumferential membrane staining that is intense and less than or equal to 10%    of tumor cells)      IHC1+ - Negative    (Incomplete membrane staining that is faint/barely perceptible and within greater than  10% of tumor cells      IHC 0 - Negative    (No staining is Observed or Membrane staining that is incomplete and is faint/barely perceptible and within    less than or equal to 10% of tumor cells)    REFERENCES:  1) Rosa VARGAS et al: Recommendations for Human Epidermal Growth Factor Receptor 2 Testing in Breast Cancer.      American Society of Clinical Oncology/College of American Pathologists Clinical Practice Guideline Update.      Arch Pathol Lab Med. 2014;138: 241-256     * HER-2/khari stain performed using the [employer]way's FDA approved methodology.     Final Diagnosis       BREAST, LEFT, 9:00, ZONE 1, ULTRASOUND GUIDED CORE BIOPSY:    1) INVASIVE DUCTAL CARCINOMA         a) GRADE: JAZZY GRADE I (of III)        b) SCORE: 4 (OF 9)    2) DUCTAL CARCINOMA IN-SITU (DCIS): NOT APPLICABLE     3) ADDITIONAL FINDINGS: NONE    4) ER/UT/HER2 IMMUNOHISTOCHEMISTRY IS PENDING AND WILL BE REPORTED IN AN ADDENDUM             COMMENT:   Dr. Soto has reviewed this case and concurs with the diagnosis.    Blue ink is confirmed. Time in formalin: Approximately 12 hours    [facility]    Clinical Information       Pre-op Diagnosis:  N63 - Left breast mass [ICD-10-CM]  Time in Formalin:  10:37 am to 10:43 am    Method: Ultrasound  Breast: Left  Quadrant or Site: 9:00-Zone 1   Number of Cores: 6  Indication: Mass--1.7 x 1.7 x 1.5 cm  Pre-test Suspicion: High  Microcalcifications on Specimen Radiograph: NA  Microcalcifications are in Box: NA  Radiologist:  Santi Aleman MD    Gross Description       The specimen is received in formalin with the patient's name, labeled \" left breast 9:00-Zone 1 core biopsy\". It consists of 6 pale " to pink, to focal red tissue cores measure up to 1 cm in length with a few small tissue particles. Tissue cores are inked blue and totally submitted in one cassette. [facility]:dls    NOTE: The specimen is placed in formalin between 10:37 to 10:43 am, 11/18, out of formalin at 1:10 pm 11/19    Charges       CPT: 52708  ICD-10: C50.912    CC:   Santi Aleman MD    Result Flag Malignant (!) Normal         Treatment Summary to Date    [facility]ria chart and setup information reviewed    Aisha Escobar MD

## 2021-06-08 NOTE — PROGRESS NOTES
"  RADIATION ONCOLOGY WEEKLY TREATMENT VISIT NOTE        Assessment / Impression       1. Primary cancer of central portion of Left breast      2. Herpes zoster without complication  acyclovir (ZOVIRAX) 800 MG tablet     Primary cancer of central portion of Left breast     Staging form: Breast, AJCC 7th Edition      Clinical stage from 12/15/2016: Stage IIB (T2, N1, M0) - Signed by Caden King MD on 12/15/2016    Tolerating radiation therapy well.  All questions and concerns addressed.      Plan:     Continue radiation treatment as prescribed.  Radiation: Site: Left Breast  Stereotactic Radiosurgery: No  Stereotactic Radiosurgery: No  Concurrent Therapy: No  Today's Dose: 2200  Total Dose for Breast: 6000  Today's Fraction/Total Fraction Breast: 11/30      Subjective:      HPI: Debra Mckeon is a 71 y.o. female with    1. Primary cancer of central portion of Left breast      2. Herpes zoster without complication  acyclovir (ZOVIRAX) 800 MG tablet       The following portions of the patient's history were reviewed and updated as appropriate: allergies, current medications, past family history, past medical history, past social history, past surgical history and problem list.      Objective:    Exam:     General: Alert and oriented, in no acute distress  Vitals:    01/23/17 1011   BP: 109/67   Pulse: 62   Temp: 97.7  F (36.5  C)   TempSrc: Oral   SpO2: 99%   Weight: 127 lb 8 oz (57.8 kg)   Height: 5' 6.5\" (1.689 m)     Debra has no Erythema, and the treatment area.  On the contralateral back she does have 3 red patches with small nodular areas that look like shingles..    Labs:  Results for orders placed or performed during the hospital encounter of 11/18/16   Surgical Pathology Exam   Result Value Ref Range    Case Report       Surgical Pathology                                Case: [id number]                                   Authorizing Provider:  Sina Larios,   Collected:           11/18/2016 " Vanda MARY                                                                           Ordering Location:     [facility]    Received:            11/18/2016 1115                                     Ultrasound                                                                   Pathologist:           Dominik Mcdaniel MD PhD                                                        Specimen:    Breast, Left, left breast 900 zone 1                                                       Addendum       IMMUNOCHEMISTRY RESULT:    - ESTROGEN RECEPTOR:                             POSITIVE (GREATER THAN 90% OF TUMOR NUCLEI, STRONG)    - PROGESTERONE RECEPTOR:                  POSITIVE  (GREATER THAN 90% OF TUMOR NUCLEI,STRONG)    - HER2:                                                             NEGATIVE (SCORE 0)    IMMUNOCHEMISTRY ALSO REVIEWED BY DR. POWERS     CPT: 88360 x3  PQRS: 3394F, 3395F    Note - Estrogen and Progesterone Receptor Immunoperoxidase Stains:  These stains were done using the following criteria:    Specimen fixative: Formalin-fixed paraffin-embedded sections    Detection system: Biotin-free multimer-based technology detection system ([employer])    Retrieval method: CC1 pretreatment; a adenike-based buffer with a slightly basic PH used at an elevated     temperature (95+5 degrees C)    Clone:  Estrogen receptor-SP1, Rabbit monoclonal ([place])     Progesterone receptor-1E2, Rabbit monoclonal ([place])    Scoring method: Intensity of nuclear stain, strong vs weak vs  absent; % of cells stained     Note - HER-2/khari Immunoperoxidase Stain:  This stain was done using the following criteria:    Specimen fixative: Formalin-fixed paraffin-embedded sections    Detection system: Biotin-free multimer-based technology detection system ([employer])    Retrieval method: CC1 pretreatment; a adenike-based buffer with a slightly basic PH used at an elevated     temperature (95 plus or minus 5  degrees C)    Clone:  4B5, Rabbit monoclonal ([place] [facility])    Scoring method: IHC 3+ - Positive (Circumferential membrane staining that is complete, intense, and     within greater than 10% of tumor cells)      IHC 2+ - Equivocal    (Circumferential Membrane staining that is incomplete and/or weak/moderate and within greater than 10% of    tumor cells or complete and circumferential membrane staining that is intense and less than or equal to 10%    of tumor cells)      IHC1+ - Negative    (Incomplete membrane staining that is faint/barely perceptible and within greater than  10% of tumor cells      IHC 0 - Negative    (No staining is Observed or Membrane staining that is incomplete and is faint/barely perceptible and within    less than or equal to 10% of tumor cells)    REFERENCES:  1) Rosa VARGAS et al: Recommendations for Human Epidermal Growth Factor Receptor 2 Testing in Breast Cancer.      American Society of Clinical Oncology/College of American Pathologists Clinical Practice Guideline Update.      Arch Pathol Lab Med. 2014;138: 241-256     * HER-2/khari stain performed using the [place] [facility]'s FDA approved methodology.     Final Diagnosis       BREAST, LEFT, 9:00, ZONE 1, ULTRASOUND GUIDED CORE BIOPSY:    1) INVASIVE DUCTAL CARCINOMA         a) GRADE: JAZZY GRADE I (of III)        b) SCORE: 4 (OF 9)    2) DUCTAL CARCINOMA IN-SITU (DCIS): NOT APPLICABLE     3) ADDITIONAL FINDINGS: NONE    4) ER/TN/HER2 IMMUNOHISTOCHEMISTRY IS PENDING AND WILL BE REPORTED IN AN ADDENDUM             COMMENT:   Dr. Soto has reviewed this case and concurs with the diagnosis.    Blue ink is confirmed. Time in formalin: Approximately 12 hours    MCSS    Clinical Information       Pre-op Diagnosis:  N63 - Left breast mass [ICD-10-CM]  Time in Formalin:  10:37 am to 10:43 am    Method: Ultrasound  Breast: Left  Quadrant or Site: 9:00-Zone 1   Number of Cores: 6  Indication: Mass--1.7 x 1.7 x 1.5 cm  Pre-test Suspicion:  "High  Microcalcifications on Specimen Radiograph: NA  Microcalcifications are in Box: NA  Radiologist:  Santi Aleman MD    Gross Description       The specimen is received in formalin with the patient's name, labeled \" left breast 9:00-Zone 1 core biopsy\". It consists of 6 pale to pink, to focal red tissue cores measure up to 1 cm in length with a few small tissue particles. Tissue cores are inked blue and totally submitted in one cassette. [facility]:dls    NOTE: The specimen is placed in formalin between 10:37 to 10:43 am, 11/18, out of formalin at 1:10 pm 11/19    Charges       CPT: 35929  ICD-10: C50.912    CC:   Santi Aleman MD    Result Flag Malignant (!) Normal         Treatment Summary to Date    Aria chart and setup information reviewed    Aisha Escobar MD   "

## 2021-06-08 NOTE — PROGRESS NOTES
"  RADIATION ONCOLOGY WEEKLY TREATMENT VISIT NOTE        Assessment / Impression       1. Primary cancer of central portion of Left breast        Primary cancer of central portion of Left breast     Staging form: Breast, AJCC 7th Edition      Clinical stage from 12/15/2016: Stage IIB (T2, N1, M0) - Signed by Caden King MD on 12/15/2016    Tolerating radiation therapy well.  All questions and concerns addressed.      Plan:     Continue radiation treatment as prescribed.  Radiation: Site: Left breast  Stereotactic Radiosurgery: No  Stereotactic Radiosurgery: No  Concurrent Therapy: No  Today's Dose: 200  Total Dose for Breast: 6000  Today's Fraction/Total Fraction Breast: 1/30      Subjective:      HPI: Debra Mckeon is a 71 y.o. female with    1. Primary cancer of central portion of Left breast          The following portions of the patient's history were reviewed and updated as appropriate: allergies, current medications, past family history, past medical history, past social history, past surgical history and problem list.      Objective:    Exam:     General: Alert and oriented, in no acute distress  Vitals:    01/09/17 1108   BP: 113/66   Pulse: 61   Temp: 97.6  F (36.4  C)   TempSrc: Oral   SpO2: 99%   Weight: 129 lb 1.6 oz (58.6 kg)   Height: 5' 6.5\" (1.689 m)     Debra has no Erythema.    Labs:  Results for orders placed or performed during the hospital encounter of 11/18/16   Surgical Pathology Exam   Result Value Ref Range    Case Report       Surgical Pathology                                Case: [id number]                                   Authorizing Provider:  Sina Larios,   Collected:           11/18/2016 Vanda MARY                                                                           Ordering Location:     [facility]    Received:            11/18/2016 1115                                     Ultrasound                                "                                    Pathologist:           Dominik Mcdaniel MD PhD                                                        Specimen:    Breast, Left, left breast 900 zone 1                                                       Addendum       IMMUNOCHEMISTRY RESULT:    - ESTROGEN RECEPTOR:                             POSITIVE (GREATER THAN 90% OF TUMOR NUCLEI, STRONG)    - PROGESTERONE RECEPTOR:                  POSITIVE  (GREATER THAN 90% OF TUMOR NUCLEI,STRONG)    - HER2:                                                             NEGATIVE (SCORE 0)    IMMUNOCHEMISTRY ALSO REVIEWED BY DR. POWERS     CPT: 88360 x3  PQRS: 3394F, 3395F    Note - Estrogen and Progesterone Receptor Immunoperoxidase Stains:  These stains were done using the following criteria:    Specimen fixative: Formalin-fixed paraffin-embedded sections    Detection system: Biotin-free multimer-based technology detection system ([employer])    Retrieval method: CC1 pretreatment; a adenike-based buffer with a slightly basic PH used at an elevated     temperature (95+5 degrees C)    Clone:  Estrogen receptor-SP1, Rabbit monoclonal ([place])     Progesterone receptor-1E2, Rabbit monoclonal ([employer])    Scoring method: Intensity of nuclear stain, strong vs weak vs  absent; % of cells stained     Note - HER-2/khari Immunoperoxidase Stain:  This stain was done using the following criteria:    Specimen fixative: Formalin-fixed paraffin-embedded sections    Detection system: Biotin-free multimer-based technology detection system ([employer])    Retrieval method: CC1 pretreatment; a adenike-based buffer with a slightly basic PH used at an elevated     temperature (95 plus or minus 5 degrees C)    Clone:  4B5, Rabbit monoclonal ([place] [facility])    Scoring method: IHC 3+ - Positive (Circumferential membrane staining that is complete, intense, and     within greater than 10% of tumor cells)      IHC 2+ - Equivocal    (Circumferential Membrane staining that is  "incomplete and/or weak/moderate and within greater than 10% of    tumor cells or complete and circumferential membrane staining that is intense and less than or equal to 10%    of tumor cells)      IHC1+ - Negative    (Incomplete membrane staining that is faint/barely perceptible and within greater than  10% of tumor cells      IHC 0 - Negative    (No staining is Observed or Membrane staining that is incomplete and is faint/barely perceptible and within    less than or equal to 10% of tumor cells)    REFERENCES:  1) Rosa VARGAS et al: Recommendations for Human Epidermal Growth Factor Receptor 2 Testing in Breast Cancer.      American Society of Clinical Oncology/College of American Pathologists Clinical Practice Guideline Update.      Arch Pathol Lab Med. 2014;138: 241-256     * HER-2/khari stain performed using the [place] [facility]'s FDA approved methodology.     Final Diagnosis       BREAST, LEFT, 9:00, ZONE 1, ULTRASOUND GUIDED CORE BIOPSY:    1) INVASIVE DUCTAL CARCINOMA         a) GRADE: JAZZY GRADE I (of III)        b) SCORE: 4 (OF 9)    2) DUCTAL CARCINOMA IN-SITU (DCIS): NOT APPLICABLE     3) ADDITIONAL FINDINGS: NONE    4) ER/OH/HER2 IMMUNOHISTOCHEMISTRY IS PENDING AND WILL BE REPORTED IN AN ADDENDUM             COMMENT:   Dr. Soto has reviewed this case and concurs with the diagnosis.    Blue ink is confirmed. Time in formalin: Approximately 12 hours    MCSS    Clinical Information       Pre-op Diagnosis:  N63 - Left breast mass [ICD-10-CM]  Time in Formalin:  10:37 am to 10:43 am    Method: Ultrasound  Breast: Left  Quadrant or Site: 9:00-Zone 1   Number of Cores: 6  Indication: Mass--1.7 x 1.7 x 1.5 cm  Pre-test Suspicion: High  Microcalcifications on Specimen Radiograph: NA  Microcalcifications are in Box: NA  Radiologist:  Santi Aleman MD    Gross Description       The specimen is received in formalin with the patient's name, labeled \" left breast 9:00-Zone 1 core biopsy\". It consists of 6 pale " to pink, to focal red tissue cores measure up to 1 cm in length with a few small tissue particles. Tissue cores are inked blue and totally submitted in one cassette. [facility]:dls    NOTE: The specimen is placed in formalin between 10:37 to 10:43 am, 11/18, out of formalin at 1:10 pm 11/19    Charges       CPT: 91992  ICD-10: C50.912    CC:   Santi Aleman MD    Result Flag Malignant (!) Normal         Treatment Summary to Date    Aria chart and setup information reviewed    Aisha Escobar MD

## 2021-06-08 NOTE — PROGRESS NOTES
RADIATION ONCOLOGY WEEKLY TREATMENT VISIT NOTE        Assessment / Impression       1. Primary cancer of central portion of Left breast        Primary cancer of central portion of Left breast     Staging form: Breast, AJCC 7th Edition      Clinical stage from 12/15/2016: Stage IIB (T2, N1, M0) - Signed by Caden King MD on 12/15/2016    Tolerating radiation therapy well.  All questions and concerns addressed.      Plan:     Continue radiation treatment as prescribed.  Radiation: Site: Left Breast  Stereotactic Radiosurgery: No  Stereotactic Radiosurgery: No  Concurrent Therapy: No  Today's Dose: 4200  Total Dose for Breast: 6000  Today's Fraction/Total Fraction Breast: 21/30      Subjective:      HPI: Debra Mckeon is a 71 y.o. female with    1. Primary cancer of central portion of Left breast          The following portions of the patient's history were reviewed and updated as appropriate: allergies, current medications, past family history, past medical history, past social history, past surgical history and problem list.      Objective:    Exam:     General: Alert and oriented, in no acute distress  Vitals:    02/06/17 1033   BP: 111/62   Pulse: 71   Temp: 97.6  F (36.4  C)   TempSrc: Oral   SpO2: 100%   Weight: 129 lb 8 oz (58.7 kg)     Debra has mild Erythema.    Labs:  Results for orders placed or performed during the hospital encounter of 11/18/16   Surgical Pathology Exam   Result Value Ref Range    Case Report       Surgical Pathology                                Case: [id number]                                   Authorizing Provider:  Sina Larios,   Collected:           11/18/2016 Vanda MARY                                                                           Ordering Location:     [facility]    Received:            11/18/2016 1115                                     Ultrasound                                                                    Pathologist:           Dominik Mcdaniel MD PhD                                                        Specimen:    Breast, Left, left breast 900 zone 1                                                       Addendum       IMMUNOCHEMISTRY RESULT:    - ESTROGEN RECEPTOR:                             POSITIVE (GREATER THAN 90% OF TUMOR NUCLEI, STRONG)    - PROGESTERONE RECEPTOR:                  POSITIVE  (GREATER THAN 90% OF TUMOR NUCLEI,STRONG)    - HER2:                                                             NEGATIVE (SCORE 0)    IMMUNOCHEMISTRY ALSO REVIEWED BY DR. POWERS     CPT: 88360 x3  PQRS: 3394F, 3395F    Note - Estrogen and Progesterone Receptor Immunoperoxidase Stains:  These stains were done using the following criteria:    Specimen fixative: Formalin-fixed paraffin-embedded sections    Detection system: Biotin-free multimer-based technology detection system ([employer])    Retrieval method: CC1 pretreatment; a adenike-based buffer with a slightly basic PH used at an elevated     temperature (95+5 degrees C)    Clone:  Estrogen receptor-SP1, Rabbit monoclonal ([place])     Progesterone receptor-1E2, Rabbit monoclonal ([employer])    Scoring method: Intensity of nuclear stain, strong vs weak vs  absent; % of cells stained     Note - HER-2/khari Immunoperoxidase Stain:  This stain was done using the following criteria:    Specimen fixative: Formalin-fixed paraffin-embedded sections    Detection system: Biotin-free multimer-based technology detection system ([employer])    Retrieval method: CC1 pretreatment; a adenike-based buffer with a slightly basic PH used at an elevated     temperature (95 plus or minus 5 degrees C)    Clone:  4B5, Rabbit monoclonal ([place] [facility])    Scoring method: IHC 3+ - Positive (Circumferential membrane staining that is complete, intense, and     within greater than 10% of tumor cells)      IHC 2+ - Equivocal    (Circumferential Membrane staining that is incomplete and/or  "weak/moderate and within greater than 10% of    tumor cells or complete and circumferential membrane staining that is intense and less than or equal to 10%    of tumor cells)      IHC1+ - Negative    (Incomplete membrane staining that is faint/barely perceptible and within greater than  10% of tumor cells      IHC 0 - Negative    (No staining is Observed or Membrane staining that is incomplete and is faint/barely perceptible and within    less than or equal to 10% of tumor cells)    REFERENCES:  1) Rosa VARGAS et al: Recommendations for Human Epidermal Growth Factor Receptor 2 Testing in Breast Cancer.      American Society of Clinical Oncology/College of American Pathologists Clinical Practice Guideline Update.      Arch Pathol Lab Med. 2014;138: 241-256     * HER-2/khari stain performed using the [place] [facility]'s FDA approved methodology.     Final Diagnosis       BREAST, LEFT, 9:00, ZONE 1, ULTRASOUND GUIDED CORE BIOPSY:    1) INVASIVE DUCTAL CARCINOMA         a) GRADE: JAZZY GRADE I (of III)        b) SCORE: 4 (OF 9)    2) DUCTAL CARCINOMA IN-SITU (DCIS): NOT APPLICABLE     3) ADDITIONAL FINDINGS: NONE    4) ER/VT/HER2 IMMUNOHISTOCHEMISTRY IS PENDING AND WILL BE REPORTED IN AN ADDENDUM             COMMENT:   Dr. Soto has reviewed this case and concurs with the diagnosis.    Blue ink is confirmed. Time in formalin: Approximately 12 hours    MCSS    Clinical Information       Pre-op Diagnosis:  N63 - Left breast mass [ICD-10-CM]  Time in Formalin:  10:37 am to 10:43 am    Method: Ultrasound  Breast: Left  Quadrant or Site: 9:00-Zone 1   Number of Cores: 6  Indication: Mass--1.7 x 1.7 x 1.5 cm  Pre-test Suspicion: High  Microcalcifications on Specimen Radiograph: NA  Microcalcifications are in Box: NA  Radiologist:  Santi Aleman MD    Gross Description       The specimen is received in formalin with the patient's name, labeled \" left breast 9:00-Zone 1 core biopsy\". It consists of 6 pale to pink, to focal " red tissue cores measure up to 1 cm in length with a few small tissue particles. Tissue cores are inked blue and totally submitted in one cassette. [facility]:dls    NOTE: The specimen is placed in formalin between 10:37 to 10:43 am, 11/18, out of formalin at 1:10 pm 11/19    Charges       CPT: 49696  ICD-10: C50.912    CC:   Santi Aleman MD    Result Flag Malignant (!) Normal         Treatment Summary to Date    Aria chart and setup information reviewed    Aisha Escobar MD

## 2021-06-08 NOTE — PROGRESS NOTES
[facility] Hematology/Oncology Inpatient progress note  Patient: Debra Mckeon  MRN: [id number]  Date of Service: 2017       Chief complaint      1. Primary cancer of central portion of Left breast   Comprehensive Metabolic Panel    HM1(CBC and Differential)    DXA Bone Density Scan   2. Osteopenia  DXA Bone Density Scan        Assessment     1. CA breasts left-sided stage II, T2 N1 M0 ER positive GA positive HER-2/khari negative by IHC. Negative margins. One sentinel lymph node positive out of 2. This is a grade 1 tumor. Oncotype score of 6.  2. Good Gen. health otherwise.  3. Her mother was diagnosed with breast cancer at the same age and  from metastatic breast cancer a few years later. She is somewhat concerned about that.  4. Use of some alternative medications for health promotion in preservation.    Plan     1. Continue radiation.  2. Start Anastrozole in February. She did mention that she has a trip planned in April that she wants to go for couple of weeks.  3. Baseline DEXA.  4. Good diet and exercise.    HPI    Debra Mckeon is a 71 y.o. old female breast cancer located in her left breast measuring 2.2 cm in size presenting on the CT scan that was done to look for pulmonary embolism back in 2016 when she presented with pleuritic chest pain. She was then seen by Dr. Larios who ordered an Onco blot test which apparently was positive. She then had another mammogram in 2016 which was also suggestive of malignancy in the left breast. She underwent biopsy on  which confirmed breast cancer ER positive GA positive HER-2/khari negative. Subsequent to that she was seen by Dr. Powers who performed lumpectomy with sentinel lymph node biopsy on  which showed invasive ductal carcinoma 2.2 cm in size negative margins with one of the 2 sentinel lymph nodes positive for metastatic cancer the size in the lymph node was 9 mm. She is recovering well from her surgery.     We did  Oncotype and it came back low at 6. So she has proceeded to start radiation. She is handling it well.         Review of system      Detailed pertinent 8 system review of systems was done.  Findings noted.      Past Medical, family and social history     Medical History  Active Ambulatory (Non-Hospital) Problems    Diagnosis     Primary cancer of central portion of Left breast      Osteopenia     Menopause Has Occurred     Past Medical History   Diagnosis Date     Breast cancer      Celiac disease 2008     Disease of thyroid gland      Hypothyroid      Melanoma 2012     Shingles      Squamous cell skin cancer, wrist, left 2015       Physical exam      Vitals:    01/12/17 1101   BP: 111/62   Pulse: 82   Temp: 97.5  F (36.4  C)   SpO2: 97%       GENERAL: No acute distress. Cooperative in conversation.   HEENT: Pupils are equal, round and reactive. Oral mucosa is clean and intact. No ulcerations or mucositis noted. No bleeding noted.  RESP:Chest symmetric lungs are clear bilaterally per auscultation. Regular respiratory rate. No wheezes or rhonchi.  CV: Normal S1 S2 Regular, rate and rhythm. No murmurs.  ABD: Nondistended, soft, nontender. Positive bowel sounds. No organomegaly.   EXTREMITIES: No lower extremity edema.   NEURO: Non- focal. Alert and oriented x3.  Cranial nerves appear intact.  PSYCH: Within normal limits. No depression or anxiety.  SKIN: Warm dry intact.    LYMPH NODES: Bilateral cervical, supraclavicular, axillary lymph node examination was done.  Negative for any palpable adenopathy.      Labs and radiology      No results found for this or any previous visit (from the past 24 hour(s)).      No results found.      Caden King MD

## 2021-06-08 NOTE — PROGRESS NOTES
"  RADIATION ONCOLOGY WEEKLY TREATMENT VISIT NOTE        Assessment / Impression       1. Primary cancer of central portion of Left breast        Primary cancer of central portion of Left breast     Staging form: Breast, AJCC 7th Edition      Clinical stage from 12/15/2016: Stage IIB (T2, N1, M0) - Signed by Caden King MD on 12/15/2016    Tolerating radiation therapy well.  All questions and concerns addressed.      Plan:     Continue radiation treatment as prescribed.  Radiation: Site: Left Breast  Stereotactic Radiosurgery: No  Stereotactic Radiosurgery: No  Concurrent Therapy: No  Today's Dose: 5200  Total Dose for Breast: 6000  Today's Fraction/Total Fraction Breast: 26/30      Subjective:      HPI: Debra Mckeon is a 72 y.o. female with    1. Primary cancer of central portion of Left breast          The following portions of the patient's history were reviewed and updated as appropriate: allergies, current medications, past family history, past medical history, past social history, past surgical history and problem list.      Objective:    Exam:     General: Alert and oriented, in no acute distress  Vitals:    02/13/17 1023   BP: 122/59   Pulse: 60   Temp: 97.5  F (36.4  C)   TempSrc: Oral   SpO2: 100%   Weight: 129 lb 3.2 oz (58.6 kg)   Height: 5' 6.5\" (1.689 m)     Debra has mild, moderate Erythema.    Labs:  Results for orders placed or performed during the hospital encounter of 11/18/16   Surgical Pathology Exam   Result Value Ref Range    Case Report       Surgical Pathology                                Case: [id number]                                   Authorizing Provider:  Sina Larios,   Collected:           11/18/2016 Vanda MARY                                                                           Ordering Location:     [facility]    Received:            11/18/2016 1115                                     Ultrasound                 "                                                   Pathologist:           Dominik Mcdaniel MD PhD                                                        Specimen:    Breast, Left, left breast 900 zone 1                                                       Addendum       IMMUNOCHEMISTRY RESULT:    - ESTROGEN RECEPTOR:                             POSITIVE (GREATER THAN 90% OF TUMOR NUCLEI, STRONG)    - PROGESTERONE RECEPTOR:                  POSITIVE  (GREATER THAN 90% OF TUMOR NUCLEI,STRONG)    - HER2:                                                             NEGATIVE (SCORE 0)    IMMUNOCHEMISTRY ALSO REVIEWED BY DR. POWERS     CPT: 88360 x3  PQRS: 3394F, 3395F    Note - Estrogen and Progesterone Receptor Immunoperoxidase Stains:  These stains were done using the following criteria:    Specimen fixative: Formalin-fixed paraffin-embedded sections    Detection system: Biotin-free multimer-based technology detection system ([employer])    Retrieval method: CC1 pretreatment; a adenike-based buffer with a slightly basic PH used at an elevated     temperature (95+5 degrees C)    Clone:  Estrogen receptor-SP1, Rabbit monoclonal ([place])     Progesterone receptor-1E2, Rabbit monoclonal ([employer])    Scoring method: Intensity of nuclear stain, strong vs weak vs  absent; % of cells stained     Note - HER-2/khari Immunoperoxidase Stain:  This stain was done using the following criteria:    Specimen fixative: Formalin-fixed paraffin-embedded sections    Detection system: Biotin-free multimer-based technology detection system ([employer])    Retrieval method: CC1 pretreatment; a adenike-based buffer with a slightly basic PH used at an elevated     temperature (95 plus or minus 5 degrees C)    Clone:  4B5, Rabbit monoclonal ([place] [employer])    Scoring method: IHC 3+ - Positive (Circumferential membrane staining that is complete, intense, and     within greater than 10% of tumor cells)      IHC 2+ - Equivocal    (Circumferential Membrane  "staining that is incomplete and/or weak/moderate and within greater than 10% of    tumor cells or complete and circumferential membrane staining that is intense and less than or equal to 10%    of tumor cells)      IHC1+ - Negative    (Incomplete membrane staining that is faint/barely perceptible and within greater than  10% of tumor cells      IHC 0 - Negative    (No staining is Observed or Membrane staining that is incomplete and is faint/barely perceptible and within    less than or equal to 10% of tumor cells)    REFERENCES:  1) Rosa VARGAS et al: Recommendations for Human Epidermal Growth Factor Receptor 2 Testing in Breast Cancer.      American Society of Clinical Oncology/College of American Pathologists Clinical Practice Guideline Update.      Arch Pathol Lab Med. 2014;138: 241-256     * HER-2/khari stain performed using the [place] [employer]'s FDA approved methodology.     Final Diagnosis       BREAST, LEFT, 9:00, ZONE 1, ULTRASOUND GUIDED CORE BIOPSY:    1) INVASIVE DUCTAL CARCINOMA         a) GRADE: JAZZY GRADE I (of III)        b) SCORE: 4 (OF 9)    2) DUCTAL CARCINOMA IN-SITU (DCIS): NOT APPLICABLE     3) ADDITIONAL FINDINGS: NONE    4) ER/RI/HER2 IMMUNOHISTOCHEMISTRY IS PENDING AND WILL BE REPORTED IN AN ADDENDUM             COMMENT:   Dr. Soto has reviewed this case and concurs with the diagnosis.    Blue ink is confirmed. Time in formalin: Approximately 12 hours    MCSS    Clinical Information       Pre-op Diagnosis:  N63 - Left breast mass [ICD-10-CM]  Time in Formalin:  10:37 am to 10:43 am    Method: Ultrasound  Breast: Left  Quadrant or Site: 9:00-Zone 1   Number of Cores: 6  Indication: Mass--1.7 x 1.7 x 1.5 cm  Pre-test Suspicion: High  Microcalcifications on Specimen Radiograph: NA  Microcalcifications are in Box: NA  Radiologist:  Santi Aleman MD    Gross Description       The specimen is received in formalin with the patient's name, labeled \" left breast 9:00-Zone 1 core biopsy\". It " consists of 6 pale to pink, to focal red tissue cores measure up to 1 cm in length with a few small tissue particles. Tissue cores are inked blue and totally submitted in one cassette. [facility]:dls    NOTE: The specimen is placed in formalin between 10:37 to 10:43 am, 11/18, out of formalin at 1:10 pm 11/19    Charges       CPT: 97315  ICD-10: C50.912    CC:   Santi Aleman MD    Result Flag Malignant (!) Normal         Treatment Summary to Date    Aria chart and setup information reviewed    Aisha Escobar MD

## 2021-06-08 NOTE — PROGRESS NOTES
RADIATION ONCOLOGY WEEKLY TREATMENT VISIT NOTE        Assessment / Impression       1. Primary cancer of central portion of Left breast        Primary cancer of central portion of Left breast     Staging form: Breast, AJCC 7th Edition      Clinical stage from 12/15/2016: Stage IIB (T2, N1, M0) - Signed by Caden King MD on 12/15/2016    Tolerating radiation therapy well.  All questions and concerns addressed.      Plan:     Continue radiation treatment as prescribed.  Radiation: Site: Left Breast  Stereotactic Radiosurgery: No  Stereotactic Radiosurgery: No  Concurrent Therapy: No  Today's Dose: 1200  Total Dose for Breast: 6000  Today's Fraction/Total Fraction Breast: 6/30      Subjective:      HPI: Debra Mckeon is a 71 y.o. female with    1. Primary cancer of central portion of Left breast          The following portions of the patient's history were reviewed and updated as appropriate: allergies, current medications, past family history, past medical history, past social history, past surgical history and problem list.      Objective:    Exam:     General: Alert and oriented, in no acute distress  There were no vitals filed for this visit.  Debra has no Erythema.    Labs:  Results for orders placed or performed during the hospital encounter of 11/18/16   Surgical Pathology Exam   Result Value Ref Range    Case Report       Surgical Pathology                                Case: [id number]                                   Authorizing Provider:  Sina Larios,   Collected:           11/18/2016 Vanda MARY                                                                           Ordering Location:     [facility]    Received:            11/18/2016 0941                                     Ultrasound                                                                   Pathologist:           Dominik Mcdaniel MD PhD                                                         Specimen:    Breast, Left, left breast 900 zone 1                                                       Addendum       IMMUNOCHEMISTRY RESULT:    - ESTROGEN RECEPTOR:                             POSITIVE (GREATER THAN 90% OF TUMOR NUCLEI, STRONG)    - PROGESTERONE RECEPTOR:                  POSITIVE  (GREATER THAN 90% OF TUMOR NUCLEI,STRONG)    - HER2:                                                             NEGATIVE (SCORE 0)    IMMUNOCHEMISTRY ALSO REVIEWED BY DR. POWERS     CPT: 88360 x3  PQRS: 3394F, 3395F    Note - Estrogen and Progesterone Receptor Immunoperoxidase Stains:  These stains were done using the following criteria:    Specimen fixative: Formalin-fixed paraffin-embedded sections    Detection system: Biotin-free multimer-based technology detection system ([employer])    Retrieval method: CC1 pretreatment; a adenike-based buffer with a slightly basic PH used at an elevated     temperature (95+5 degrees C)    Clone:  Estrogen receptor-SP1, Rabbit monoclonal ([place])     Progesterone receptor-1E2, Rabbit monoclonal ([employer])    Scoring method: Intensity of nuclear stain, strong vs weak vs  absent; % of cells stained     Note - HER-2/khari Immunoperoxidase Stain:  This stain was done using the following criteria:    Specimen fixative: Formalin-fixed paraffin-embedded sections    Detection system: Biotin-free multimer-based technology detection system ([employer])    Retrieval method: CC1 pretreatment; a adenike-based buffer with a slightly basic PH used at an elevated     temperature (95 plus or minus 5 degrees C)    Clone:  4B5, Rabbit monoclonal ([employer])    Scoring method: IHC 3+ - Positive (Circumferential membrane staining that is complete, intense, and     within greater than 10% of tumor cells)      IHC 2+ - Equivocal    (Circumferential Membrane staining that is incomplete and/or weak/moderate and within greater than 10% of    tumor cells or complete and circumferential membrane staining  "that is intense and less than or equal to 10%    of tumor cells)      IHC1+ - Negative    (Incomplete membrane staining that is faint/barely perceptible and within greater than  10% of tumor cells      IHC 0 - Negative    (No staining is Observed or Membrane staining that is incomplete and is faint/barely perceptible and within    less than or equal to 10% of tumor cells)    REFERENCES:  1) Rosa VARGAS et al: Recommendations for Human Epidermal Growth Factor Receptor 2 Testing in Breast Cancer.      American Society of Clinical Oncology/College of American Pathologists Clinical Practice Guideline Update.      Arch Pathol Lab Med. 2014;138: 241-256     * HER-2/khari stain performed using the [place] [facility]'s FDA approved methodology.     Final Diagnosis       BREAST, LEFT, 9:00, ZONE 1, ULTRASOUND GUIDED CORE BIOPSY:    1) INVASIVE DUCTAL CARCINOMA         a) GRADE: JAZZY GRADE I (of III)        b) SCORE: 4 (OF 9)    2) DUCTAL CARCINOMA IN-SITU (DCIS): NOT APPLICABLE     3) ADDITIONAL FINDINGS: NONE    4) ER/[place]/HER2 IMMUNOHISTOCHEMISTRY IS PENDING AND WILL BE REPORTED IN AN ADDENDUM             COMMENT:   Dr. Soto has reviewed this case and concurs with the diagnosis.    Blue ink is confirmed. Time in formalin: Approximately 12 hours    MCSS    Clinical Information       Pre-op Diagnosis:  N63 - Left breast mass [ICD-10-CM]  Time in Formalin:  10:37 am to 10:43 am    Method: Ultrasound  Breast: Left  Quadrant or Site: 9:00-Zone 1   Number of Cores: 6  Indication: Mass--1.7 x 1.7 x 1.5 cm  Pre-test Suspicion: High  Microcalcifications on Specimen Radiograph: NA  Microcalcifications are in Box: NA  Radiologist:  Santi Aleman MD    Gross Description       The specimen is received in formalin with the patient's name, labeled \" left breast 9:00-Zone 1 core biopsy\". It consists of 6 pale to pink, to focal red tissue cores measure up to 1 cm in length with a few small tissue particles. Tissue cores are inked blue " and totally submitted in one cassette. [facility]:dls    NOTE: The specimen is placed in formalin between 10:37 to 10:43 am, 11/18, out of formalin at 1:10 pm 11/19    Charges       CPT: 99739  ICD-10: C50.912    CC:   Santi Aleman MD    Result Flag Malignant (!) Normal         Treatment Summary to Date    Aria chart and setup information reviewed    Aisha Escobar MD

## 2021-06-09 NOTE — PROGRESS NOTES
[facility] Radiation Oncology Follow Up Note    Patient: Debra Mckeon  MRN: [id number]  Date of Service: 03/13/2017    Assessment / Impression     1. Primary cancer of central portion of Left breast         ECOG Peformance Status  ECOG Performance Status: 0  Distress Assessment Score  Distress Assessment Score: No distress  Body site: Breast     Plan:     Breast cancer:  Mild hyperpigmentation in the treatment region with a few freckles developing in the inframammary crease.  Otherwise normal.  She'll continue to follow up with medical oncology.  I would be glad to see her back should she have any future concerns or questions.    Face to face time  15 minutes with > 50% spent on consultation, education and coordination of care.    Subjective:      HPI: Debra Mckeon is a 72 y.o. female with a postmenopausal female who was incidentally diagnosed with a left breast cancer. She presented with a 2.2 cm mass on a CT scan which was done to rule out pulmonary embolism in June 2016. At that time she was presenting with pleuritic chest pain. She had a mammogram which was suggestive of malignancy in left breast and she had a biopsy in November 18 confirming an ER positive and OH positive left breast cancer. She saw Dr. Powers who subsequently took her to the operating room on December 8 and did lumpectomy and sentinel lymph node biopsy. Final pathology showed invasive ductal carcinoma 2.2 cm in size with 1 out of 2 sentinel lymph nodes positive for cancer with a lymph node metastasis being 9 mm in dimensions.     She was treated to the left breast using the field within field radiation technique a dose of 5000 cGy in 25 fractions, delivered from January 9 through February 10 of 2017.  She underwent a boost to the surgical cavity from February 13 through February 17 of 2017, to an additional 1000 cGy given in 5 fractions.          Current Outpatient Prescriptions   Medication Sig Dispense Refill     anastrozole  (ARIMIDEX) 1 mg tablet Take 1 tablet (1 mg total) by mouth daily. 90 tablet 3     CALCIUM CARBONATE (CALCIUM 500 ORAL) Take 1,000 mg by mouth daily.       cholecalciferol, vitamin D3, (VITAMIN D3) 5,000 unit Tab Take 1 tablet by mouth daily.       Lactobacillus rhamnosus GG (CULTURELLE) 10-15 Billion cell capsule Take 1 capsule by mouth daily.        LACTOSE-REDUCED FOOD (NUTRITIONAL SUPPLEMENT ORAL) Take 2 capsules by mouth daily. adrenevive2 caps in am and 0-2 midday based on levels of stress       magnesium carbonate Powd Use 500 mg As Directed bedtime.        thyroid, pork, 30 mg Tab Take by mouth 2 (two) times a day. armour       TURMERIC (CURCUMIN MISC) Take 2 capsules by mouth daily.       No current facility-administered medications for this visit.        The following portions of the patient's history were reviewed and updated as appropriate: allergies, current medications, past family history, past medical history, past social history, past surgical history and problem list.    Review of Systems    Constitutional  Constitutional (WDL): All constitutional elements are within defined limits  Neurosensory  Neurosensory (WDL): All neurosensory elements are within defined limits  Eye        Eye Disorder (WDL): All eye disorder elements are within defined limits  Ear     Cardiovascular  Cardiovascular (WDL): All cardiovascular elements are within defined limits  Pulmonary  Respiratory (WDL): Exceptions to WDL  Dyspnea: Shortness of breath with moderate exertion (noticing a bit more with working out)  Gastrointestinal  Gastrointestinal (WDL): All gastrointestinal elements are within defined limits  Genitourinary  Genitourinary (WDL): All genitourinary elements are within defined limits              Musculoskeletal              Musculoskeletal and Connetive Tissue Disorders (WDL): All Musculoskeletal and Connetive Tissue Disorder elements are within defined limits  Integumentary  Integumentary (WDL): All  integumentary elements are within defined limits  Patient Coping  Patient Coping: Accepting  Pain              Currently in Pain: No/denies  Accompanied by  Accompanied by: Family Member    Objective:     Physical Exam    There were no vitals filed for this visit.     Visit Vitals     /56     Pulse 75     Wt 127 lb 12.8 oz (58 kg)     SpO2 98%     BMI 20.32 kg/m2     General appearance: alert, appears stated age and cooperative  Head: Normocephalic, without obvious abnormality, atraumatic  Eyes: conjunctivae/corneas clear. PERRL, EOM's intact. Fundi benign.  Breasts: Surgical scar left breast well-healed.,  Mild hyperpigmentation in the treatment region with a few darker areas of pigmentation in the inframammary crease.    Recent Labs: No results found for this or any previous visit (from the past 168 hour(s)).    Imaging: Imaging results 30 days: No results found.    Signed by: Aisha Escobar MD

## 2021-06-09 NOTE — PROGRESS NOTES
[facility] Hematology/Oncology Inpatient progress note  Patient: Debra Mckeon  MRN: [id number]  Date of Service: 3/16/2017       Chief complaint      1. Primary cancer of central portion of Left breast           Assessment     1. CA breasts left-sided stage II, T2 N1 M0 ER positive TN positive HER-2/khari negative by IHC. Negative margins. One sentinel lymph node positive out of 2. This is a grade 1 tumor. Oncotype score of 6. Finished radiation and started Anastrozole in 2017. Tolerating it well.  2. Osteopenia on DEXA.  3. Her mother was diagnosed with breast cancer at the same age and  from metastatic breast cancer a few years later. She is somewhat concerned about that.  4. Use of some alternative medications for health promotion in preservation.    Plan     1. Continue Anastrozole.  2. Not interested in Ibrance study.  3. Calcium and Vit d in her diet.  4. Good diet and exercise.  5. Arm sleeve when travelling.    HPI    Debra Mckeon is a 72 y.o. old female breast cancer located in her left breast measuring 2.2 cm in size presenting on the CT scan that was done to look for pulmonary embolism back in 2016 when she presented with pleuritic chest pain. She was then seen by Dr. Larios who ordered an Onco blot test which apparently was positive. She then had another mammogram in 2016 which was also suggestive of malignancy in the left breast. She underwent biopsy on  which confirmed breast cancer ER positive TN positive HER-2/khari negative. Subsequent to that she was seen by Dr. Powers who performed lumpectomy with sentinel lymph node biopsy on  which showed invasive ductal carcinoma 2.2 cm in size negative margins with one of the 2 sentinel lymph nodes positive for metastatic cancer the size in the lymph node was 9 mm. She is recovering well from her surgery.     We did Oncotype and it came back low at 6. So she has proceeded with radiation that was finished in  Feb 17, 2017. Started on Anastrozole at that time.  She is handling it well.     Did have shingles during XRT. Now better. Here for scheduled f/u.    Review of system      Detailed pertinent 8 system review of systems was done.  Findings noted.      Past Medical, family and social history     Medical History  Active Ambulatory (Non-Hospital) Problems    Diagnosis     Herpes zoster without complication     Primary cancer of central portion of Left breast      Osteopenia     Menopause Has Occurred     Past Medical History:   Diagnosis Date     Breast cancer      Celiac disease 2008     Disease of thyroid gland      Hypothyroid      Melanoma 2012     Shingles      Squamous cell skin cancer, wrist, left 2015       Physical exam      Vitals:    03/16/17 1327   BP: 118/62   Pulse: 68   Temp: 97.6  F (36.4  C)   SpO2: 98%       GENERAL: No acute distress. Cooperative in conversation.   HEENT: Pupils are equal, round and reactive. Oral mucosa is clean and intact. No ulcerations or mucositis noted. No bleeding noted.  RESP:Chest symmetric lungs are clear bilaterally per auscultation. Regular respiratory rate. No wheezes or rhonchi.  CV: Normal S1 S2 Regular, rate and rhythm. No murmurs.  ABD: Nondistended, soft, nontender. Positive bowel sounds. No organomegaly.   EXTREMITIES: No lower extremity edema.   NEURO: Non- focal. Alert and oriented x3.  Cranial nerves appear intact.  PSYCH: Within normal limits. No depression or anxiety.  SKIN: Warm dry intact.    LYMPH NODES: Bilateral cervical, supraclavicular, axillary lymph node examination was done.  Negative for any palpable adenopathy.      Labs and radiology      Recent Results (from the past 24 hour(s))   Comprehensive Metabolic Panel   Result Value Ref Range    Sodium 143 136 - 145 mmol/L    Potassium 3.9 3.5 - 5.0 mmol/L    Chloride 107 98 - 107 mmol/L    CO2 30 22 - 31 mmol/L    Anion Gap, Calculation 6 5 - 18 mmol/L    Glucose 86 70 - 125 mg/dL    BUN 11 8 - 28 mg/dL     Creatinine 0.68 0.60 - 1.10 mg/dL    GFR MDRD Af Amer >60 >60 mL/min/1.73m2    GFR MDRD Non Af Amer >60 >60 mL/min/1.73m2    Bilirubin, Total 0.3 0.0 - 1.0 mg/dL    Calcium 9.4 8.5 - 10.5 mg/dL    Protein, Total 6.7 6.0 - 8.0 g/dL    Albumin 3.9 3.5 - 5.0 g/dL    Alkaline Phosphatase 83 45 - 120 U/L    AST 21 0 - 40 U/L    ALT 14 0 - 45 U/L   HM1 (CBC with Diff)   Result Value Ref Range    WBC 5.4 4.0 - 11.0 thou/uL    RBC 4.71 3.80 - 5.40 mill/uL    Hemoglobin 13.8 12.0 - 16.0 g/dL    Hematocrit 41.3 35.0 - 47.0 %    MCV 88 80 - 100 fL    MCH 29.2 27.0 - 34.0 pg    MCHC 33.3 32.0 - 36.0 g/dL    RDW 13.0 11.0 - 14.5 %    Platelets 228 140 - 440 thou/uL    MPV 7.6 7.0 - 10.0 fL    Neutrophils % 71 (H) 50 - 70 %    Lymphocytes % 16 (L) 20 - 40 %    Monocytes % 10 2 - 10 %    Eosinophils % 3 0 - 6 %    Basophils % 0 0 - 2 %    Neutrophils Absolute 3.8 2.0 - 7.7 thou/uL    Lymphocytes Absolute 0.8 0.8 - 4.4 thou/uL    Monocytes Absolute 0.5 0.0 - 0.9 thou/uL    Eosinophils Absolute 0.2 0.0 - 0.4 thou/uL    Basophils Absolute 0.0 0.0 - 0.2 thou/uL         No results found.      Caden King MD

## 2021-06-11 NOTE — PROGRESS NOTES
Met with patient and her spouse Quinton in medical oncology where she has follow up with Dr King.  Debra continues to thrive and is tolerating hormonal therapy. She shared details about recent travel to [place].  She continues to enjoy caring for her grandchildren.  She expresses no needs today from navigation standpoint and calls are welcomed Macy Beck RN

## 2021-06-11 NOTE — PROGRESS NOTES
[facility] Hematology/Oncology Inpatient progress note  Patient: Debra Mckeon  MRN: [id number]  Date of Service: 6/15/2017       Chief complaint      1. Primary cancer of central portion of Left breast   Comprehensive Metabolic Panel   2. Osteopenia  Comprehensive Metabolic Panel        Assessment     1. CA breasts left-sided stage II, T2 N1 M0 ER positive NV positive HER-2/khari negative by IHC. Negative margins. One sentinel lymph node positive out of 2. This is a grade 1 tumor. Oncotype score of 6. Finished radiation and started Anastrozole in 2017. Tolerating it well.  2. Osteopenia on DEXA.  3. Her mother was diagnosed with breast cancer at the same age and  from metastatic breast cancer a few years later. This has been her worry.  4. Use of some alternative medications for health promotion in preservation.    Plan     1. Continue Anastrozole.  2. She was offered but was not interested in Ibrance study.  3. Calcium and Vit d in her diet.  4. Good diet and exercise.  5. Arm sleeve when travelling.  6. Mammogram in November.  7. Bone density to be rechecked in 2019.    HPI    Debra Mckeon is a 72 y.o. old female breast cancer located in her left breast measuring 2.2 cm in size presenting on the CT scan that was done to look for pulmonary embolism back in 2016 when she presented with pleuritic chest pain. She was then seen by Dr. Larios who ordered an Onco blot test which apparently was positive. She then had another mammogram in 2016 which was also suggestive of malignancy in the left breast. She underwent biopsy on  which confirmed breast cancer ER positive NV positive HER-2/khari negative. Subsequent to that she was seen by Dr. Powers who performed lumpectomy with sentinel lymph node biopsy on 2016 which showed invasive ductal carcinoma 2.2 cm in size negative margins with one of the 2 sentinel lymph nodes positive for metastatic cancer the size in  the lymph node was 9 mm. She recovered well from surgery.     We did Oncotype and it came back low at 6. So she has proceeded with radiation that was finished in Feb 17, 2017. Started on Anastrozole in February 2017.  She is handling it well.     Did have shingles during XRT. Now better. Here for scheduled f/u.    Review of system      Detailed pertinent 8 system review of systems was done.  Findings noted.      Past Medical, family and social history     Medical History  Active Ambulatory (Non-Hospital) Problems    Diagnosis     Herpes zoster without complication     Primary cancer of central portion of Left breast      Osteopenia     Menopause Has Occurred     Past Medical History:   Diagnosis Date     Breast cancer      Celiac disease 2008     Disease of thyroid gland      Hypothyroid      Melanoma 2012     Shingles      Squamous cell skin cancer, wrist, left 2015       Physical exam      Vitals:    06/15/17 1029   BP: 107/66   Pulse: 88   Temp: 97.7  F (36.5  C)   SpO2: 95%       GENERAL: No acute distress. Cooperative in conversation.   HEENT: Pupils are equal, round and reactive. Oral mucosa is clean and intact. No ulcerations or mucositis noted. No bleeding noted.  RESP:Chest symmetric lungs are clear bilaterally per auscultation. Regular respiratory rate. No wheezes or rhonchi.  CV: Normal S1 S2 Regular, rate and rhythm. No murmurs.  ABD: Nondistended, soft, nontender. Positive bowel sounds. No organomegaly.   EXTREMITIES: No lower extremity edema.   NEURO: Non- focal. Alert and oriented x3.  Cranial nerves appear intact.  PSYCH: Within normal limits. No depression or anxiety.  SKIN: Warm dry intact.    LYMPH NODES: Bilateral cervical, supraclavicular, axillary lymph node examination was done.  Negative for any palpable adenopathy.      Labs and radiology      No results found for this or any previous visit (from the past 24 hour(s)).      No results found.      Caden King MD

## 2021-06-11 NOTE — PROGRESS NOTES
I met with Debra today to present her SCP. I explained the contents of the care plan including the treatment summary and survivorship resources. She denies struggling with any issues. I did say that they are there if she needs them. I also asked if she could take the time to read it over when she had time in the next 2 weeks and then I will call and see if she has any questions or comments. She advised that she would. I congratulated her on her survivorship! Madiha

## 2021-06-12 NOTE — PROGRESS NOTES
[facility] Hematology/Oncology Inpatient progress note  Patient: Debra Mckeon  MRN: [id number]  Date of Service: 2017       Chief complaint      1. Primary cancer of central portion of Left breast      2. Osteopenia          Assessment     1. CA breasts left-sided stage II, T2 N1 M0 ER positive AR positive HER-2/khari negative by IHC. Negative margins. One sentinel lymph node positive out of 2. This is a grade 1 tumor. Oncotype score of 6. Finished radiation and started Anastrozole in 2017. Tolerating it well.  2. Osteopenia on DEXA.  3. Her mother was diagnosed with breast cancer at the same age and  from metastatic breast cancer a few years later. This has been her worry.  4. Use of some alternative medications for health promotion in preservation.    Plan     1. Continue Anastrozole.  Follow-up with me in 4 months.  2. She was offered but was not interested in Ibrance study.  3. Increase calcium and Vit d in her diet.  4. Good diet and exercise.  5. Arm sleeve when travelling.  6. Mammogram in November or December this year.  7. Bone density to be rechecked in 2019.    HPI    Debra Mckeon is a 72 y.o. old female breast cancer located in her left breast measuring 2.2 cm in size presenting on the CT scan that was done to look for pulmonary embolism back in 2016 when she presented with pleuritic chest pain. She was then seen by Dr. Larios who ordered an Onco blot test which apparently was positive. She then had another mammogram in 2016 which was also suggestive of malignancy in the left breast. She underwent biopsy on 2016 which confirmed breast cancer ER positive AR positive HER-2/khari negative. Subsequent to that she was seen by Dr. Powers who performed lumpectomy with sentinel lymph node biopsy on 2016 which showed invasive ductal carcinoma 2.2 cm in size negative margins with one of the 2 sentinel lymph nodes positive for metastatic cancer  the size in the lymph node was 9 mm. She recovered well from surgery.     We did Oncotype and it came back low at 6. So she has proceeded with radiation that was finished in Feb 17, 2017. Started on Anastrozole in February 2017.  She is handling it well.  She did have a bone density done which showed low bone mass/osteopenia.    Did have shingles during XRT. Now better. Here for scheduled f/u.  She did sustain a fall 3 weeks ago.  She is noticing some achiness in her left arm at the extreme range of movement.    Review of system      Detailed pertinent 8 system review of systems was done.  Findings noted.      Past Medical, family and social history     Medical History  Active Ambulatory (Non-Hospital) Problems    Diagnosis     Herpes zoster without complication     Primary cancer of central portion of Left breast      Osteopenia     Menopause Has Occurred     Past Medical History:   Diagnosis Date     Breast cancer      Celiac disease 2008     Disease of thyroid gland      Hypothyroid      Melanoma 2012     Shingles      Squamous cell skin cancer, wrist, left 2015       Physical exam      Vitals:    09/11/17 1042   BP: 120/64   Pulse: 64   Temp: 97.6  F (36.4  C)   SpO2: 98%       GENERAL: No acute distress. Cooperative in conversation.   HEENT: Pupils are equal, round and reactive. Oral mucosa is clean and intact. No ulcerations or mucositis noted. No bleeding noted.  RESP:Chest symmetric lungs are clear bilaterally per auscultation. Regular respiratory rate. No wheezes or rhonchi.  CV: Normal S1 S2 Regular, rate and rhythm. No murmurs.  ABD: Nondistended, soft, nontender. Positive bowel sounds. No organomegaly.   EXTREMITIES: No lower extremity edema.  Slight muscular tenderness in her left arm.  NEURO: Non- focal. Alert and oriented x3.  Cranial nerves appear intact.  PSYCH: Within normal limits. No depression or anxiety.  SKIN: Warm dry intact.    LYMPH NODES: Bilateral cervical, supraclavicular, axillary lymph  node examination was done.  Negative for any palpable adenopathy.  BREAST: Bilateral breast examination is done.  Right breast normal no nipple discharge no lump.  Left breast status post surgery and radiation.  Slight induration of the type of surgery and a small lumpiness on the upper outer 2 o'clock position very close to the nipple.  No nipple discharge.  Bilateral axillary exam is negative.      Labs and radiology      Recent Results (from the past 24 hour(s))   Comprehensive Metabolic Panel   Result Value Ref Range    Sodium 142 136 - 145 mmol/L    Potassium 4.5 3.5 - 5.0 mmol/L    Chloride 106 98 - 107 mmol/L    CO2 32 (H) 22 - 31 mmol/L    Anion Gap, Calculation 4 (L) 5 - 18 mmol/L    Glucose 101 70 - 125 mg/dL    BUN 10 8 - 28 mg/dL    Creatinine 0.72 0.60 - 1.10 mg/dL    GFR MDRD Af Amer >60 >60 mL/min/1.73m2    GFR MDRD Non Af Amer >60 >60 mL/min/1.73m2    Bilirubin, Total 0.4 0.0 - 1.0 mg/dL    Calcium 9.6 8.5 - 10.5 mg/dL    Protein, Total 6.3 6.0 - 8.0 g/dL    Albumin 3.7 3.5 - 5.0 g/dL    Alkaline Phosphatase 91 45 - 120 U/L    AST 25 0 - 40 U/L    ALT 44 0 - 45 U/L         No results found.      Caden King MD

## 2021-06-15 PROBLEM — B02.9 HERPES ZOSTER WITHOUT COMPLICATION: Status: ACTIVE | Noted: 2017-01-23

## 2021-06-15 NOTE — PROGRESS NOTES
[facility] Hematology/Oncology Inpatient progress note  Patient: Debra Mckeon  MRN: [id number]  Date of Service: 2018       Chief complaint      1. Malignant neoplasm of lower-inner quadrant of left breast in female, estrogen receptor positive          Assessment     1. CA breasts left-sided stage II, T2 N1 M0 ER positive DE positive HER-2/khari negative by IHC. Negative margins. One sentinel lymph node positive out of 2. This is a grade 1 tumor. Oncotype score of 6. Finished radiation and started Anastrozole in 2017. Tolerating it well.  2. Osteopenia on DEXA.  3. Her mother was diagnosed with breast cancer at the same age and  from metastatic breast cancer a few years later. This has been her worry.  4. Use of some alternative medications for health promotion in preservation.    Plan     1. Continue Anastrozole.  Follow-up with me in 4 months.  2. She was offered but was not interested in Ibrance study.  3. Increase calcium and Vit d in her diet.  4. Good diet and exercise.  5. Arm sleeve when travelling.  6. Mammogram in November or December this year.  7. Bone density to be rechecked in 2019.    HPI    Debra Mckeon is a 72 y.o. old female breast cancer located in her left breast measuring 2.2 cm in size presenting on the CT scan that was done to look for pulmonary embolism back in 2016 when she presented with pleuritic chest pain. She was then seen by Dr. Larios who ordered an Onco blot test which apparently was positive. She then had another mammogram in 2016 which was also suggestive of malignancy in the left breast. She underwent biopsy on 2016 which confirmed breast cancer ER positive DE positive HER-2/khari negative. Subsequent to that she was seen by Dr. Powers who performed lumpectomy with sentinel lymph node biopsy on 2016 which showed invasive ductal carcinoma 2.2 cm in size negative margins with one of the 2 sentinel lymph nodes  positive for metastatic cancer the size in the lymph node was 9 mm. She recovered well from surgery.     We did Oncotype and it came back low at 6. So she has proceeded with radiation that was finished in Feb 17, 2017. Started on Anastrozole in February 2017.  She is handling it well.  She did have a bone density done which showed low bone mass/osteopenia.    Did have shingles during XRT. Now better. Here for scheduled f/u.      Review of system      Detailed pertinent 8 system review of systems was done.  Findings noted.      Past Medical, family and social history     Medical History  Active Ambulatory (Non-Hospital) Problems    Diagnosis     Herpes zoster without complication     Malignant neoplasm of lower-inner quadrant of left breast in female, estrogen receptor positive     Osteopenia     Menopause Has Occurred     Past Medical History:   Diagnosis Date     Breast cancer      Celiac disease 2008     Disease of thyroid gland      Hx of radiation therapy 01/2017     Hypothyroid      Melanoma 2012     Shingles      Squamous cell skin cancer, wrist, left 2015       Physical exam      Vitals:    01/11/18 0937   BP: 121/73   Pulse: 75   Temp: 97.6  F (36.4  C)   SpO2: 97%       GENERAL: No acute distress. Cooperative in conversation.   HEENT: Pupils are equal, round and reactive. Oral mucosa is clean and intact. No ulcerations or mucositis noted. No bleeding noted.  RESP:Chest symmetric lungs are clear bilaterally per auscultation. Regular respiratory rate. No wheezes or rhonchi.  CV: Normal S1 S2 Regular, rate and rhythm. No murmurs.  ABD: Nondistended, soft, nontender. Positive bowel sounds. No organomegaly.   EXTREMITIES: No lower extremity edema.  Slight muscular tenderness in her left arm.  NEURO: Non- focal. Alert and oriented x3.  Cranial nerves appear intact.  PSYCH: Within normal limits. No depression or anxiety.  SKIN: Warm dry intact.    LYMPH NODES: Bilateral cervical, supraclavicular, axillary lymph  node examination was done.  Negative for any palpable adenopathy.  BREAST: Bilateral breast examination is done.  Right breast normal no nipple discharge no lump.  Left breast status post surgery and radiation.  Slight induration of the type of surgery and a small lumpiness on the upper outer 2 o'clock position very close to the nipple.  No nipple discharge.  Bilateral axillary exam is negative.      Labs and radiology      No results found for this or any previous visit (from the past 24 hour(s)).      No results found.      Caden King MD

## 2021-06-15 NOTE — PROGRESS NOTES
Debra Mckeon is a 76 y.o. female who is being evaluated via a billable video visit.      How would you like to obtain your AVS? MyChart.  If dropped from the video visit, the video invitation should be resent by: Text to cell phone: [phone]  Will anyone else be joining your video visit? No

## 2021-06-15 NOTE — PROGRESS NOTES
"This is a 72 y.o. woman who comes in for  continued follow-up of her left breast cancer.  She is now 12 months  status post left partial mastectomy with radiation.  She is feeling well.  She has no new complaints today.      Please see the chart review for PMH, Meds, allergies, FH and SH.    ROS:  A 12 point comprehensive review of systems was negative except as noted.      Physical Exam:  /72 (Patient Site: Right Arm, Patient Position: Sitting, Cuff Size: Adult Regular)  Pulse 76  Ht 5' 6.5\" (1.689 m)  Wt 129 lb (58.5 kg)  SpO2 97%  BMI 20.51 kg/m2  General appearance: alert, appears stated age and cooperative  Breasts: There are no palpable masses. There are minimal radiation changes. The scar has healed up well.  Lymph nodes: Cervical, supraclavicular, and axillary nodes normal.  Neurologic: Grossly normal    Data Review: Reviewed her current mammograms. No evidence of disease.      Impression: Personal History of breast cancer. NOD.     Plan: Follow up with me can be as needed.  She should continue with yearly mammograms and continue to follow-up with her oncologist.  She knows to contact me if she has any new questions or concerns.    "

## 2021-06-15 NOTE — PROGRESS NOTES
[facility] Hematology and Oncology Progress Note    Patient: Debra Mckeon  MRN: [id number]  Date of Service: 02/15/2021        Reason for Visit    Chief Complaint   Patient presents with     HE Cancer     Malignant neoplasm of lower-inner quadrant of left breast in female, estrogen receptor positive       Assessment and Plan  Cancer Staging  Malignant neoplasm of lower-inner quadrant of left breast in female, estrogen receptor positive (H)  Staging form: Breast, AJCC 7th Edition  - Clinical stage from 12/15/2016: Stage IIB (T2, N1, M0) - Signed by Caden King MD on 12/15/2016    1. Breast cancer, stage IIB: pt has been on hormonal therapy since February 2017. She is set up for Mammogram next month. She will return in 6 months.     2. Osteopenia: High risk for worsening bone density with the aromatase inhibitor.  She will continue calcium and vitamin D supplement. We will repeat her DEXA scan now.  Encourage also to participate in weightbearing exercises. Good calcium in diet.     ECOG Performance   ECOG Performance Status: 0    Distress Assessment  Distress Assessment Score: No distress    Pain   none        Problem List    1. Malignant neoplasm of lower-inner quadrant of left breast in female, estrogen receptor positive (H)  anastrozole (ARIMIDEX) 1 mg tablet        CC: Mariajose Hebert MD    ______________________________________________________________________________    History of Present Illness    Debra Mckeon is a 76 y.o. female breast cancer located in her left breast measuring 2.2 cm in size presenting on the CT scan that was done to look for pulmonary embolism back in June 2016 when she presented with pleuritic chest pain. She was then seen by Dr. Larios who ordered an Onco blot test which apparently was positive. She then had another mammogram in November 2016 which was also suggestive of malignancy in the left breast. She underwent biopsy on November 18, 2016 which confirmed breast  cancer ER positive IA positive HER-2/khari negative. Subsequent to that she was seen by Dr. Powers who performed lumpectomy with sentinel lymph node biopsy on December 8, 2016 which showed invasive ductal carcinoma 2.2 cm in size negative margins with one of the 2 sentinel lymph nodes positive for metastatic cancer the size in the lymph node was 9 mm. She recovered well from surgery.     We did Oncotype and it came back low at 6. So she has proceeded with radiation that was finished in Feb 17, 2017. Started on Anastrozole in February 2017.  She is handling it well.  She did have a bone density done which showed low bone mass/osteopenia.     She has been taking her medications without any significant problem.  Overall has been doing fine.  No changes in her breast.     Pain Status  Currently in Pain: No/denies    Review of Systems    Constitutional  Constitutional (WDL): All constitutional elements are within defined limits  Neurosensory  Neurosensory (WDL): All neurosensory elements are within defined limits  Eye   Eye Disorder (WDL): All eye disorder elements are within defined limits  Ear  Ear Disorder (WDL): All ear disorder elements are within defined limits  Cardiovascular  Cardiovascular (WDL): All cardiovascular elements are within defined limits  Pulmonary  Respiratory (WDL): Within Defined Limits  Gastrointestinal  Gastrointestinal (WDL): All gastrointestinal elements are within defined limits  Genitourinary  Genitourinary (WDL): All genitourinary elements are within defined limits  Lymphatic  Lymph (WDL): All lymph disorder elements are within defined limits  Musculoskeletal and Connective Tissue  Musculoskeletal and Connetive Tissue Disorders (WDL): Exceptions to WDL  Arthralgia: Concerns(left hip)  Integumentary  Integumentary (WDL): All integumentary elements are within defined limits  Patient Coping  Patient Coping: Accepting;Open/discussion  Accompanied by  Accompanied by: Alone  Oral Chemo Adherence          Past History  Past Medical History:   Diagnosis Date     Breast cancer (H) 2016     Celiac disease 2008     Disease of thyroid gland      Hx of radiation therapy 01/2017     Hypothyroid      Melanoma (H) 2012    on nose     Shingles      Squamous cell skin cancer, wrist, left 2015         Past Surgical History:   Procedure Laterality Date     BREAST BIOPSY Right 2013     BREAST BIOPSY Bilateral 1990's     BREAST LUMPECTOMY       BREAST LUMPECTOMY Left 12/07/2016    DR. DOVE     COLONOSCOPY  2009     TONSILLECTOMY  1964     TUBAL LIGATION         PHYSICAL EXAM  There were no vitals taken for this visit.    GENERAL: no acute distress. Cooperative in conversation. Alone on video  RESP: Regular respiratory rate. No expiratory wheezes   NEURO: non focal. Alert and oriented x3.   PSYCH: within normal limits. No depression or anxiety.  SKIN: exposed skin is dry intact.         Lab Results    No results found for this or any previous visit (from the past 168 hour(s)).    Imaging    No results found.    Video-Visit Details    Type of service:  Video Visit  Video Start Time: 9:26    Video End Time (time video stopped): 9:34  Originating Location (pt. Location): Home    Distant Location (provider location):  [facility]     Platform used for Video Visit: DoximYunno     Total time spent with patient in face to face time on vidoe, chart review and documentation was 15 minutes.        Signed by: Porsche Gary CNP

## 2021-06-16 NOTE — TELEPHONE ENCOUNTER
Called patient to notify her of worsening bone density scan and diagnosis of osteoporosis. Reviewed recommendations of starting a bone strengthening medication. Patient would like to review the drug information to make a decision. I will mail patient the drug information on all options given and follow-up with her in a couple weeks to see if she has made a decision. Copy of bone density report mailed to patient per her request with drug information. Patient verbalized understanding to results and recommendations. Patient voiced appreciation of follow-up. Gladys Sadler, CMA

## 2021-06-16 NOTE — TELEPHONE ENCOUNTER
----- Message from Porsche Gary CNP sent at 3/16/2021  1:01 PM CDT -----  Can you call her and let her know her bones have gotten worse and now show osteoporosis. I would like her to start weekly pill fosamax, prolia or zometa......thx. K

## 2021-06-16 NOTE — TELEPHONE ENCOUNTER
----- Message from Gladys Sadler CMA sent at 3/16/2021  2:01 PM CDT -----  Regarding: Bone strengthener follow-up  Spoke with patient on 3/16 regarding worsening dexa scan and recommendations on course of action.  Patient requested drug information on weekly Fosamax, Prolia and Zometa.     Follow-up to see if she has made a decision on the route she wants to go.

## 2021-06-17 NOTE — PROGRESS NOTES
Met with Debra in medical oncology where she has returned for follow up with Dr. King for history of breast cancer.  She is energized from recent travel and shared the details of several trips with me.  She is challenged by her 's diagnosis of tinnitus. This does have an impact on their activities because it is accompanied by anxiety.  She hopes for management and he strives for cure.  Provided emotional support and therapeutic presence. Macy Beck RN

## 2021-06-17 NOTE — PROGRESS NOTES
[facility] Hematology/Oncology Inpatient progress note  Patient: Debra Mckeon  MRN: [id number]  Date of Service: 2018       Chief complaint      1. Malignant neoplasm of lower-inner quadrant of left breast in female, estrogen receptor positive  Comprehensive Metabolic Panel   2. Osteopenia  Comprehensive Metabolic Panel        Assessment     1. CA breasts left-sided stage II, T2 N1 M0 ER positive [place] positive HER-2/khari negative by IHC. Negative margins. One sentinel lymph node positive out of 2. This is a grade 1 tumor. Oncotype score of 6. Finished radiation and started Anastrozole in 2017. Tolerating it well.  2. Osteopenia on DEXA.  3. Her mother was diagnosed with breast cancer at the same age and  from metastatic breast cancer a few years later. This has been her worry.  4. Use of some alternative medications for health promotion in preservation.  5. Recent fall and fracture of right 8th rib.    Plan     1. Continue Anastrozole.  Follow-up with me in 5 months.  2. Continue with good diet and exercise.  3. Increase calcium and Vit d in her diet.  4. Arm sleeve when travelling.  5. Mammogram in November or December this year.  6. Bone density to be rechecked in 2019.    HPI    Debra Mckeon is a 73 y.o. old female breast cancer located in her left breast measuring 2.2 cm in size presenting on the CT scan that was done to look for pulmonary embolism back in 2016 when she presented with pleuritic chest pain. She was then seen by Dr. Larios who ordered an Onco blot test which apparently was positive. She then had another mammogram in 2016 which was also suggestive of malignancy in the left breast. She underwent biopsy on 2016 which confirmed breast cancer ER positive [place] positive HER-2/khari negative. Subsequent to that she was seen by Dr. Powers who performed lumpectomy with sentinel lymph node biopsy on 2016 which showed invasive ductal  carcinoma 2.2 cm in size negative margins with one of the 2 sentinel lymph nodes positive for metastatic cancer the size in the lymph node was 9 mm. She recovered well from surgery.     We did Oncotype and it came back low at 6. So she has proceeded with radiation that was finished in Feb 17, 2017. Started on Anastrozole in February 2017.  She is handling it well.  She did have a bone density done which showed low bone mass/osteopenia.    Here for scheduled f/u.  She fell and fractured her right 8th rib in April 2018. Slightly sore from that. Otherwise doing well.    Review of system      Detailed pertinent 8 system review of systems was done.  Findings noted.      Past Medical, family and social history     Medical History  Active Ambulatory (Non-Hospital) Problems    Diagnosis     Herpes zoster without complication     Malignant neoplasm of lower-inner quadrant of left breast in female, estrogen receptor positive     Osteopenia     Menopause Has Occurred     Past Medical History:   Diagnosis Date     Breast cancer      Celiac disease 2008     Disease of thyroid gland      Hx of radiation therapy 01/2017     Hypothyroid      Melanoma 2012     Shingles      Squamous cell skin cancer, wrist, left 2015       Physical exam      Vitals:    05/02/18 1033   BP: 111/68   Pulse: 86   Temp: 97.8  F (36.6  C)   SpO2: 98%       GENERAL: No acute distress. Cooperative in conversation.   HEENT: Pupils are equal, round and reactive. Oral mucosa is clean and intact. No ulcerations or mucositis noted. No bleeding noted.  RESP:Chest symmetric lungs are clear bilaterally per auscultation. Regular respiratory rate. No wheezes or rhonchi.  CV: Normal S1 S2 Regular, rate and rhythm. No murmurs.  ABD: Nondistended, soft, nontender. Positive bowel sounds. No organomegaly.   EXTREMITIES: No lower extremity edema.  Slight muscular tenderness in her left arm.  NEURO: Non- focal. Alert and oriented x3.  Cranial nerves appear intact.  PSYCH:  Within normal limits. No depression or anxiety.  SKIN: Warm dry intact.    LYMPH NODES: Bilateral cervical, supraclavicular, axillary lymph node examination was done.  Negative for any palpable adenopathy.      Labs and radiology      No results found for this or any previous visit (from the past 24 hour(s)).      No results found.      Cdaen King MD

## 2021-06-20 NOTE — PROGRESS NOTES
[facility] Hematology/Oncology Inpatient progress note  Patient: Debra Mckeon  MRN: [id number]  Date of Service: 10/3/2018       Chief complaint      1. Malignant neoplasm of lower-inner quadrant of left breast in female, estrogen receptor positive (H)  HM1(CBC and Differential)    Lactate Dehydrogenase (LDH)    Comprehensive Metabolic Panel        Assessment     1. CA breasts left-sided stage II, T2 N1 M0 ER positive [place] positive HER-2/khari negative by IHC. Negative margins. One sentinel lymph node positive out of 2. This is a grade 1 tumor. Oncotype score of 6. Finished radiation and started Anastrozole in 2017. Tolerating it well.  2. Osteopenia on DEXA.  3. Her mother was diagnosed with breast cancer at the same age and  from metastatic breast cancer a few years later. This has been her worry.  4. Use of some alternative medications for health promotion in preservation.  5. History fracture of right 8th rib after a fall.    Plan     1. Continue Anastrozole.  Follow-up with me in 6 months.  2. Continue with good diet and exercise.  3. Increase calcium and Vit d in her diet.  4. Arm sleeve when travelling.  5. Mammogram in November or December this year.  6. Bone density to be rechecked in 2019.    HPI    Debra Mckeon is a 73 y.o. old female breast cancer located in her left breast measuring 2.2 cm in size presenting on the CT scan that was done to look for pulmonary embolism back in 2016 when she presented with pleuritic chest pain. She was then seen by Dr. Larios who ordered an Onco blot test which apparently was positive. She then had another mammogram in 2016 which was also suggestive of malignancy in the left breast. She underwent biopsy on 2016 which confirmed breast cancer ER positive [place] positive HER-2/khari negative. Subsequent to that she was seen by Dr. Powers who performed lumpectomy with sentinel lymph node biopsy on 2016 which showed  invasive ductal carcinoma 2.2 cm in size negative margins with one of the 2 sentinel lymph nodes positive for metastatic cancer the size in the lymph node was 9 mm. She recovered well from surgery.     We did Oncotype and it came back low at 6. So she has proceeded with radiation that was finished in Feb 17, 2017. Started on Anastrozole in February 2017.  She is handling it well.  She did have a bone density done which showed low bone mass/osteopenia.    Here for scheduled f/u.  She fell and fractured her right 8th rib in April 2018. Slightly sore from that. Otherwise doing well.    Review of system      Detailed pertinent 8 system review of systems was done.  Findings noted.      Past Medical, family and social history     Medical History  Active Ambulatory (Non-Hospital) Problems    Diagnosis     Herpes zoster without complication     Malignant neoplasm of lower-inner quadrant of left breast in female, estrogen receptor positive (H)     Osteopenia     Menopause Has Occurred     Past Medical History:   Diagnosis Date     Breast cancer (H)      Celiac disease 2008     Disease of thyroid gland      Hx of radiation therapy 01/2017     Hypothyroid      Melanoma (H) 2012     Shingles      Squamous cell skin cancer, wrist, left 2015       Physical exam      Vitals:    10/03/18 1040   BP: 110/66   Pulse: 74   Temp: 97.8  F (36.6  C)   SpO2: 100%       GENERAL: No acute distress. Cooperative in conversation.   HEENT: Pupils are equal, round and reactive. Oral mucosa is clean and intact. No ulcerations or mucositis noted. No bleeding noted.  RESP:Chest symmetric lungs are clear bilaterally per auscultation. Regular respiratory rate. No wheezes or rhonchi.  CV: Normal S1 S2 Regular, rate and rhythm. No murmurs.  ABD: Nondistended, soft, nontender. Positive bowel sounds. No organomegaly.   EXTREMITIES: No lower extremity edema.    NEURO: Non- focal. Alert and oriented x3.  Cranial nerves appear intact.  PSYCH: Within normal  limits. No depression or anxiety.  SKIN: Warm dry intact.    LYMPH NODES: Bilateral cervical, supraclavicular, axillary lymph node examination was done.  Negative for any palpable adenopathy.      Labs and radiology      Recent Results (from the past 24 hour(s))   Comprehensive Metabolic Panel   Result Value Ref Range    Sodium 141 136 - 145 mmol/L    Potassium 4.2 3.5 - 5.0 mmol/L    Chloride 104 98 - 107 mmol/L    CO2 30 22 - 31 mmol/L    Anion Gap, Calculation 7 5 - 18 mmol/L    Glucose 99 70 - 125 mg/dL    BUN 12 8 - 28 mg/dL    Creatinine 0.72 0.60 - 1.10 mg/dL    GFR MDRD Af Amer >60 >60 mL/min/1.73m2    GFR MDRD Non Af Amer >60 >60 mL/min/1.73m2    Bilirubin, Total 0.6 0.0 - 1.0 mg/dL    Calcium 9.6 8.5 - 10.5 mg/dL    Protein, Total 6.5 6.0 - 8.0 g/dL    Albumin 3.9 3.5 - 5.0 g/dL    Alkaline Phosphatase 83 45 - 120 U/L    AST 22 0 - 40 U/L    ALT 18 0 - 45 U/L         No results found.      Caden King MD

## 2021-06-25 NOTE — PROGRESS NOTES
Progress Notes by Aisha Escobar MD at 3/14/2017 11:37 AM     Author: Aisha Escobar MD Service: -- Author Type: Physician    Filed: 3/14/2017 11:39 AM Encounter Date: 3/14/2017 Status: Signed    : Aisha Escobar MD (Physician)       Radiation Treatment Summary    Patient: Debra Mckeon   MRN: [id number]  : 1945  Care Provider: Aisha Escobar    Date of Service: 2017      HISTORY: Debra Mckeon was treated with 3D conformal radiation therapy.        Debra tolerated the treatment without unexpected side effects.     PLAN: Discharge instructions were given and Debra knows to call if questions/issues arise. Debra will be seen in f/u in 6 weeks without a scan.       Signed by: Aisha Escobar MD

## 2021-07-13 ENCOUNTER — RECORDS - HEALTHEAST (OUTPATIENT)
Dept: ADMINISTRATIVE | Facility: CLINIC | Age: 76
End: 2021-07-13

## 2021-07-21 ENCOUNTER — RECORDS - HEALTHEAST (OUTPATIENT)
Dept: ADMINISTRATIVE | Facility: CLINIC | Age: 76
End: 2021-07-21

## 2021-08-15 ENCOUNTER — HEALTH MAINTENANCE LETTER (OUTPATIENT)
Age: 76
End: 2021-08-15

## 2021-09-08 ENCOUNTER — ONCOLOGY VISIT (OUTPATIENT)
Dept: ONCOLOGY | Facility: HOSPITAL | Age: 76
End: 2021-09-08
Attending: INTERNAL MEDICINE
Payer: MEDICARE

## 2021-09-08 ENCOUNTER — LAB (OUTPATIENT)
Dept: INFUSION THERAPY | Facility: HOSPITAL | Age: 76
End: 2021-09-08
Attending: INTERNAL MEDICINE
Payer: MEDICARE

## 2021-09-08 VITALS
RESPIRATION RATE: 12 BRPM | WEIGHT: 127 LBS | BODY MASS INDEX: 20.19 KG/M2 | HEART RATE: 76 BPM | SYSTOLIC BLOOD PRESSURE: 116 MMHG | TEMPERATURE: 97.8 F | DIASTOLIC BLOOD PRESSURE: 69 MMHG | OXYGEN SATURATION: 97 %

## 2021-09-08 DIAGNOSIS — Z17.0 MALIGNANT NEOPLASM OF LOWER-INNER QUADRANT OF LEFT BREAST IN FEMALE, ESTROGEN RECEPTOR POSITIVE (H): ICD-10-CM

## 2021-09-08 DIAGNOSIS — C50.312 MALIGNANT NEOPLASM OF LOWER-INNER QUADRANT OF LEFT BREAST IN FEMALE, ESTROGEN RECEPTOR POSITIVE (H): Primary | ICD-10-CM

## 2021-09-08 DIAGNOSIS — C50.312 MALIGNANT NEOPLASM OF LOWER-INNER QUADRANT OF LEFT BREAST IN FEMALE, ESTROGEN RECEPTOR POSITIVE (H): ICD-10-CM

## 2021-09-08 DIAGNOSIS — Z17.0 MALIGNANT NEOPLASM OF LOWER-INNER QUADRANT OF LEFT BREAST IN FEMALE, ESTROGEN RECEPTOR POSITIVE (H): Primary | ICD-10-CM

## 2021-09-08 LAB
ALBUMIN SERPL-MCNC: 4.1 G/DL (ref 3.5–5)
ALP SERPL-CCNC: 89 U/L (ref 45–120)
ALT SERPL W P-5'-P-CCNC: 14 U/L (ref 0–45)
ANION GAP SERPL CALCULATED.3IONS-SCNC: 11 MMOL/L (ref 5–18)
AST SERPL W P-5'-P-CCNC: 25 U/L (ref 0–40)
BILIRUB SERPL-MCNC: 0.9 MG/DL (ref 0–1)
BUN SERPL-MCNC: 10 MG/DL (ref 8–28)
CALCIUM SERPL-MCNC: 9.9 MG/DL (ref 8.5–10.5)
CHLORIDE BLD-SCNC: 103 MMOL/L (ref 98–107)
CO2 SERPL-SCNC: 26 MMOL/L (ref 22–31)
CREAT SERPL-MCNC: 0.77 MG/DL (ref 0.6–1.1)
GFR SERPL CREATININE-BSD FRML MDRD: 76 ML/MIN/1.73M2
GLUCOSE BLD-MCNC: 101 MG/DL (ref 70–125)
POTASSIUM BLD-SCNC: 4.3 MMOL/L (ref 3.5–5)
PROT SERPL-MCNC: 7.2 G/DL (ref 6–8)
SODIUM SERPL-SCNC: 140 MMOL/L (ref 136–145)

## 2021-09-08 PROCEDURE — 82247 BILIRUBIN TOTAL: CPT

## 2021-09-08 PROCEDURE — 99213 OFFICE O/P EST LOW 20 MIN: CPT | Performed by: NURSE PRACTITIONER

## 2021-09-08 PROCEDURE — G0463 HOSPITAL OUTPT CLINIC VISIT: HCPCS

## 2021-09-08 PROCEDURE — G0463 HOSPITAL OUTPT CLINIC VISIT: HCPCS | Mod: 25

## 2021-09-08 PROCEDURE — 36415 COLL VENOUS BLD VENIPUNCTURE: CPT

## 2021-09-08 PROCEDURE — 82040 ASSAY OF SERUM ALBUMIN: CPT

## 2021-09-08 NOTE — LETTER
"    9/8/2021         RE: Debra Mckeon  [address]   [place] [address]        Dear Colleague,    Thank you for referring your patient, Debra Mckeon, to the [facility]. Please see a copy of my visit note below.    Oncology Rooming Note    September 8, 2021 11:17 AM   Debra Mckeon is a 76 year old female who presents for:    Chief Complaint   Patient presents with     Oncology Clinic Visit     Malignant neoplasm of lower-inner quadrant of left breast in female, estrogen receptor positive (H)     Initial Vitals: /69   Pulse 76   Temp 97.8  F (36.6  C)   Resp 12   Wt 57.6 kg (127 lb)   SpO2 97%   BMI 20.19 kg/m   Estimated body mass index is 20.19 kg/m  as calculated from the following:    Height as of 12/26/17: 1.689 m (5' 6.5\").    Weight as of this encounter: 57.6 kg (127 lb). Body surface area is 1.64 meters squared.  No Pain (0) Comment: Data Unavailable   No LMP recorded.  Allergies reviewed: Yes  Medications reviewed: Yes    Medications: Medication refills not needed today.  Pharmacy name entered into [employer]: [place] [id number]    Clinical concerns: None       Rupa Correa              [facility] Hematology and Oncology Progress Note    Patient: Debra Mckeon  MRN: [id number]  Date of Service: 09/08/2021        Reason for Visit    Chief Complaint   Patient presents with     Oncology Clinic Visit     Malignant neoplasm of lower-inner quadrant of left breast in female, estrogen receptor positive (H)       Assessment and Plan    Cancer Staging  Malignant neoplasm of lower-inner quadrant of left breast in female, estrogen receptor positive (H)  Staging form: Breast, AJCC 7th Edition  - Pathologic stage from 11/18/2016: Stage IIB (T2, N1a, cM0) - Signed by Porsche Gary APRN CNP on 9/8/2021  ER Status: Positive  [place] Status: Positive  HER2 Status: Negative      1. Breast cancer, stage IIB: pt has been " on hormonal therapy since February 2017. She is set up for Mammogram next month. She will return in January/February. At that time, she will likely be done with anastrozole. Continue mammograms every year.      2. Osteoporosis: High risk for worsening bone density with the aromatase inhibitor.  she declined any bisphosphonates. She will continue calcium and vitamin D supplement. We will repeat her DEXA scan around February 2023. If she is not coming here any longer, we will have PCP order.  Encourage also to participate in weightbearing exercises. Good calcium in diet    ECOG Performance    0 - Independent    Distress Screening (within last 30 days)    1. How concerned are you about your ability to eat? : 0  2. How concerned are you about unintended weight loss or your current weight? : 0  3. How concerned are you about feeling depressed or very sad? : 0  4. How concerned are you about feeling anxious or very scared? : 0  5. Do you struggle with the loss of meaning and dakotah in your life? : Not at all  6. How concerned are you about work and home life issues that may be affected by your cancer? : 0  7. How concerned are you about knowing what resources are available to help you? : 0  8. Do you currently have what you would describe as Scientologist or spiritual struggles?            : Not at all       Pain  Pain Score: No Pain (0)    Problem List    Patient Active Problem List   Diagnosis     Osteopenia     Menopause Has Occurred     Malignant neoplasm of lower-inner quadrant of left breast in female, estrogen receptor positive (H)     Herpes zoster without complication        ______________________________________________________________________________    History of Present Illness    Debra Mckeon is a 76 y.o. female breast cancer located in her left breast measuring 2.2 cm in size presenting on the CT scan that was done to look for pulmonary embolism back in June 2016 when she presented with pleuritic chest pain.  She was then seen by Dr. Larios who ordered an Onco blot test which apparently was positive. She then had another mammogram in November 2016 which was also suggestive of malignancy in the left breast. She underwent biopsy on November 18, 2016 which confirmed breast cancer ER positive [place] positive HER-2/khari negative. Subsequent to that she was seen by Dr. Powers who performed lumpectomy with sentinel lymph node biopsy on December 8, 2016 which showed invasive ductal carcinoma 2.2 cm in size negative margins with one of the 2 sentinel lymph nodes positive for metastatic cancer the size in the lymph node was 9 mm. She recovered well from surgery.     We did Oncotype and it came back low at 6. So she has proceeded with radiation that was finished in Feb 17, 2017. Started on Anastrozole in February 2017.  She is handling it well.  She did have a bone density done which showed osteoporosis.     She has been taking her medications without any significant problem.  Overall has been doing fine.  No changes in her breast    Review of Systems    Pertinent items are noted in HPI.    Past History    Past Medical History:   Diagnosis Date     Breast cancer (H) 2016     Celiac disease 2008     Disease of thyroid gland      Hx of radiation therapy 01/2017     Hypothyroid      Melanoma (H) 2012    on nose     Shingles      Squamous cell skin cancer, wrist, left 2015       PHYSICAL EXAM:  /69   Pulse 76   Temp 97.8  F (36.6  C)   Resp 12   Wt 57.6 kg (127 lb)   SpO2 97%   BMI 20.19 kg/m    GENERAL: no acute distress. Cooperative in conversation. Here alone, mask on  RESP: lungs are clear bilaterally per auscultation. Regular respiratory rate. No wheezes or rhonchi.  CV: Regular, rate and rhythm. No murmurs.  MUSCULOSKELETAL: No lower extremity swelling.   NEURO: non focal. Alert and oriented x3.   PSYCH: within normal limits. No depression or anxiety.  SKIN: warm dry intact   LYMPH: no cervical, supraclavicular or axillary  lymphadenopathy  BREAST: Bilateral exam done.  Lumpectomy incision is well-healed.  Very slight scarring.  No abnormal lesions or rashes noted.  No evidence for local recurrence bilaterally.        Lab Results    Recent Results (from the past 168 hour(s))   Comprehensive metabolic panel   Result Value Ref Range    Sodium 140 136 - 145 mmol/L    Potassium 4.3 3.5 - 5.0 mmol/L    Chloride 103 98 - 107 mmol/L    Carbon Dioxide (CO2) 26 22 - 31 mmol/L    Anion Gap 11 5 - 18 mmol/L    Urea Nitrogen 10 8 - 28 mg/dL    Creatinine 0.77 0.60 - 1.10 mg/dL    Calcium 9.9 8.5 - 10.5 mg/dL    Glucose 101 70 - 125 mg/dL    Alkaline Phosphatase 89 45 - 120 U/L    AST 25 0 - 40 U/L    ALT 14 0 - 45 U/L    Protein Total 7.2 6.0 - 8.0 g/dL    Albumin 4.1 3.5 - 5.0 g/dL    Bilirubin Total 0.9 0.0 - 1.0 mg/dL    GFR Estimate 76 >60 mL/min/1.73m2       Imaging    No results found.      Signed by: PILY Cabrera CNP      Again, thank you for allowing me to participate in the care of your patient.        Sincerely,        PILY Cabrera CNP

## 2021-09-08 NOTE — PROGRESS NOTES
[facility] Hematology and Oncology Progress Note    Patient: Debra Mckeon  MRN: [id number]  Date of Service: 09/08/2021        Reason for Visit    Chief Complaint   Patient presents with     Oncology Clinic Visit     Malignant neoplasm of lower-inner quadrant of left breast in female, estrogen receptor positive (H)       Assessment and Plan    Cancer Staging  Malignant neoplasm of lower-inner quadrant of left breast in female, estrogen receptor positive (H)  Staging form: Breast, AJCC 7th Edition  - Pathologic stage from 11/18/2016: Stage IIB (T2, N1a, cM0) - Signed by Porsche Gary APRN CNP on 9/8/2021  ER Status: Positive  [place] Status: Positive  HER2 Status: Negative      1. Breast cancer, stage IIB: pt has been on hormonal therapy since February 2017. She is set up for Mammogram next month. She will return in January/February. At that time, she will likely be done with anastrozole. Continue mammograms every year.      2. Osteoporosis: High risk for worsening bone density with the aromatase inhibitor.  she declined any bisphosphonates. She will continue calcium and vitamin D supplement. We will repeat her DEXA scan around February 2023. If she is not coming here any longer, we will have PCP order.  Encourage also to participate in weightbearing exercises. Good calcium in diet    ECOG Performance    0 - Independent    Distress Screening (within last 30 days)    1. How concerned are you about your ability to eat? : 0  2. How concerned are you about unintended weight loss or your current weight? : 0  3. How concerned are you about feeling depressed or very sad? : 0  4. How concerned are you about feeling anxious or very scared? : 0  5. Do you struggle with the loss of meaning and dakotah in your life? : Not at all  6. How concerned are you about work and home life issues that may be affected by your cancer? : 0  7. How concerned are you about knowing what resources are available to help you? : 0  8. Do you  currently have what you would describe as Orthodox or spiritual struggles?            : Not at all       Pain  Pain Score: No Pain (0)    Problem List    Patient Active Problem List   Diagnosis     Osteopenia     Menopause Has Occurred     Malignant neoplasm of lower-inner quadrant of left breast in female, estrogen receptor positive (H)     Herpes zoster without complication        ______________________________________________________________________________    History of Present Illness    Debra Mckeon is a 76 y.o. female breast cancer located in her left breast measuring 2.2 cm in size presenting on the CT scan that was done to look for pulmonary embolism back in June 2016 when she presented with pleuritic chest pain. She was then seen by Dr. Larios who ordered an Onco blot test which apparently was positive. She then had another mammogram in November 2016 which was also suggestive of malignancy in the left breast. She underwent biopsy on November 18, 2016 which confirmed breast cancer ER positive [place] positive HER-2/khari negative. Subsequent to that she was seen by Dr. Powers who performed lumpectomy with sentinel lymph node biopsy on December 8, 2016 which showed invasive ductal carcinoma 2.2 cm in size negative margins with one of the 2 sentinel lymph nodes positive for metastatic cancer the size in the lymph node was 9 mm. She recovered well from surgery.     We did Oncotype and it came back low at 6. So she has proceeded with radiation that was finished in Feb 17, 2017. Started on Anastrozole in February 2017.  She is handling it well.  She did have a bone density done which showed osteoporosis.     She has been taking her medications without any significant problem.  Overall has been doing fine.  No changes in her breast    Review of Systems    Pertinent items are noted in HPI.    Past History    Past Medical History:   Diagnosis Date     Breast cancer (H) 2016     Celiac disease 2008     Disease of  thyroid gland      Hx of radiation therapy 01/2017     Hypothyroid      Melanoma (H) 2012    on nose     Shingles      Squamous cell skin cancer, wrist, left 2015       PHYSICAL EXAM:  /69   Pulse 76   Temp 97.8  F (36.6  C)   Resp 12   Wt 57.6 kg (127 lb)   SpO2 97%   BMI 20.19 kg/m    GENERAL: no acute distress. Cooperative in conversation. Here alone, mask on  RESP: lungs are clear bilaterally per auscultation. Regular respiratory rate. No wheezes or rhonchi.  CV: Regular, rate and rhythm. No murmurs.  MUSCULOSKELETAL: No lower extremity swelling.   NEURO: non focal. Alert and oriented x3.   PSYCH: within normal limits. No depression or anxiety.  SKIN: warm dry intact   LYMPH: no cervical, supraclavicular or axillary lymphadenopathy  BREAST: Bilateral exam done.  Lumpectomy incision is well-healed.  Very slight scarring.  No abnormal lesions or rashes noted.  No evidence for local recurrence bilaterally.        Lab Results    Recent Results (from the past 168 hour(s))   Comprehensive metabolic panel   Result Value Ref Range    Sodium 140 136 - 145 mmol/L    Potassium 4.3 3.5 - 5.0 mmol/L    Chloride 103 98 - 107 mmol/L    Carbon Dioxide (CO2) 26 22 - 31 mmol/L    Anion Gap 11 5 - 18 mmol/L    Urea Nitrogen 10 8 - 28 mg/dL    Creatinine 0.77 0.60 - 1.10 mg/dL    Calcium 9.9 8.5 - 10.5 mg/dL    Glucose 101 70 - 125 mg/dL    Alkaline Phosphatase 89 45 - 120 U/L    AST 25 0 - 40 U/L    ALT 14 0 - 45 U/L    Protein Total 7.2 6.0 - 8.0 g/dL    Albumin 4.1 3.5 - 5.0 g/dL    Bilirubin Total 0.9 0.0 - 1.0 mg/dL    GFR Estimate 76 >60 mL/min/1.73m2       Imaging    No results found.      Signed by: PILY Cabrera CNP

## 2021-09-08 NOTE — PROGRESS NOTES
"Oncology Rooming Note    September 8, 2021 11:17 AM   Debra Mckeon is a 76 year old female who presents for:    Chief Complaint   Patient presents with     Oncology Clinic Visit     Malignant neoplasm of lower-inner quadrant of left breast in female, estrogen receptor positive (H)     Initial Vitals: /69   Pulse 76   Temp 97.8  F (36.6  C)   Resp 12   Wt 57.6 kg (127 lb)   SpO2 97%   BMI 20.19 kg/m   Estimated body mass index is 20.19 kg/m  as calculated from the following:    Height as of 12/26/17: 1.689 m (5' 6.5\").    Weight as of this encounter: 57.6 kg (127 lb). Body surface area is 1.64 meters squared.  No Pain (0) Comment: Data Unavailable   No LMP recorded.  Allergies reviewed: Yes  Medications reviewed: Yes    Medications: Medication refills not needed today.  Pharmacy name entered into [employer]: [address]    Clinical concerns: None       Rupa Correa            "

## 2021-10-10 ENCOUNTER — HEALTH MAINTENANCE LETTER (OUTPATIENT)
Age: 76
End: 2021-10-10

## 2021-11-01 ENCOUNTER — AMBULATORY - RIVER FALLS (OUTPATIENT)
Dept: FAMILY MEDICINE | Facility: CLINIC | Age: 76
End: 2021-11-01
Payer: MEDICARE

## 2022-02-10 ENCOUNTER — ONCOLOGY VISIT (OUTPATIENT)
Dept: ONCOLOGY | Facility: HOSPITAL | Age: 77
End: 2022-02-10
Attending: INTERNAL MEDICINE
Payer: MEDICARE

## 2022-02-10 VITALS
OXYGEN SATURATION: 97 % | SYSTOLIC BLOOD PRESSURE: 117 MMHG | TEMPERATURE: 98.2 F | DIASTOLIC BLOOD PRESSURE: 66 MMHG | HEART RATE: 72 BPM | BODY MASS INDEX: 20.53 KG/M2 | WEIGHT: 129.1 LBS | RESPIRATION RATE: 16 BRPM

## 2022-02-10 DIAGNOSIS — Z17.0 MALIGNANT NEOPLASM OF LOWER-INNER QUADRANT OF LEFT BREAST IN FEMALE, ESTROGEN RECEPTOR POSITIVE (H): Primary | ICD-10-CM

## 2022-02-10 DIAGNOSIS — C50.312 MALIGNANT NEOPLASM OF LOWER-INNER QUADRANT OF LEFT BREAST IN FEMALE, ESTROGEN RECEPTOR POSITIVE (H): Primary | ICD-10-CM

## 2022-02-10 DIAGNOSIS — M81.0 AGE-RELATED OSTEOPOROSIS WITHOUT CURRENT PATHOLOGICAL FRACTURE: ICD-10-CM

## 2022-02-10 PROCEDURE — 99214 OFFICE O/P EST MOD 30 MIN: CPT | Performed by: INTERNAL MEDICINE

## 2022-02-10 PROCEDURE — G0463 HOSPITAL OUTPT CLINIC VISIT: HCPCS

## 2022-02-10 ASSESSMENT — PAIN SCALES - GENERAL: PAINLEVEL: NO PAIN (0)

## 2022-02-10 NOTE — PROGRESS NOTES
[facility] cancer Care Progress Note  Patient: Debra Mckeon   MRN:  [id number]   Date of Service : Feb 10, 2022         Chief complaint      1. Malignant neoplasm of lower-inner quadrant of left breast in female, estrogen receptor positive (H)     2. Osteopenia          Assessment     1. CA breasts left-sided stage II, T2 N1 M0 ER positive NC positive HER-2/khari negative by IHC. Negative margins. One sentinel lymph node positive out of 2. This is a grade 1 tumor. Oncotype score of 6. Finished radiation and started Anastrozole in 2017. Tolerating it well.  2.  Osteoporosis confirmed in 2021.  Currently only taking calcium and vitamin D.History fracture of right 8th rib after a fall.  3.  Family history of breast cancer.  Her mother was diagnosed with breast cancer at the same age and  from metastatic breast cancer a few years later. This has been her worry.  4. Use of some alternative medications for health promotion in preservation.  5. Negative Cologuard test in 2021.  6. History of kidney stone in 2018  7.  Does not believe in Covid vaccine.    Plan     1. She has finished 5 years of anastrozole therapy.  We did discuss the option of continuing longer versus stopping.  The patient is inclined to stop and observe.  2. Continue with good diet and exercise.  3. High but not too high calcium but mostly in diet and Vit d in her diet.  4. Lymphedema prevention interventions..  5. Mammogram yearly.  6. Discussed with the patient that she should consider bisphosphonate therapy for her osteoporosis.  She is already doing some exercises to improve her bone density.  Next bone density in late spring 2023.  7. Advised to take precautions during this pandemic.    HPI    Debra Mckeon is a 77 year old  female breast cancer located in her left breast measuring 2.2 cm in size presenting on the CT scan that was done to look for pulmonary embolism back in 2016 when she  presented with pleuritic chest pain. She was then seen by Dr. Larios who ordered an Onco blot test which apparently was positive. She then had another mammogram in November 2016 which was also suggestive of malignancy in the left breast. She underwent biopsy on November 18, 2016 which confirmed breast cancer ER positive IA positive HER-2/khari negative. Subsequent to that she was seen by Dr. Powers who performed lumpectomy with sentinel lymph node biopsy on December 8, 2016 which showed invasive ductal carcinoma 2.2 cm in size negative margins with one of the 2 sentinel lymph nodes positive for metastatic cancer the size in the lymph node was 9 mm. She recovered well from surgery.     We did Oncotype and it came back low at 6. So she proceeded with radiation that was finished in Feb 17, 2017. Started on Anastrozole in February 2017.  She is handling it well.  She did have a bone density in 2017 which showed low bone mass/osteopenia.  However subsequent bone density in 2021 showed that she had osteoporosis.  She is only taking calcium and vitamin D supplementation.  She has been talked about taking bisphosphonates but is not sure about it yet.  She is doing some exercises including some weight training to help with her bone density.    She has not had Covid vaccination.  Does not believe in them.    Comes in today for scheduled follow-up.  She is excited that she has finished 5 years of anastrozole therapy.  Looking forward to stopping it.        Review of system      Detailed pertinent 8 system review of systems was done.  Findings noted.      Past Medical, family and social history     Medical History  Active Ambulatory (Non-Hospital) Problems    Diagnosis     Herpes zoster without complication     Malignant neoplasm of lower-inner quadrant of left breast in female, estrogen receptor positive (H)     Osteopenia     Menopause Has Occurred     Past Medical History:   Diagnosis Date     Breast cancer (H) 2016     Celiac  disease 2008     Disease of thyroid gland      Hx of radiation therapy 01/2017     Hypothyroid      Melanoma (H) 2012     Shingles      Squamous cell skin cancer, wrist, left 2015       Physical exam      /66   Pulse 72   Temp 98.2  F (36.8  C)   Resp 16   Wt 58.6 kg (129 lb 1.6 oz)   SpO2 97%   BMI 20.53 kg/m       GENERAL: No acute distress. Cooperative in conversation.   HEENT:  Pupils are equal, round and reactive. Oral mucosa is clean and intact. No ulcerations or mucositis noted. No bleeding noted.  RESP:Chest symmetric lungs are clear bilaterally per auscultation. Regular respiratory rate. No wheezes or rhonchi.  CV: Normal S1 S2 Regular, rate and rhythm. No murmurs.    ABD: Nondistended, soft, nontender. Positive bowel sounds. No organomegaly.   EXTREMITIES: No lower extremity edema.   NEURO: Non- focal. Alert and oriented x3.  Cranial nerves appear intact.  PSYCH: Within normal limits. No depression or anxiety.  SKIN: Warm dry intact.       Labs and radiology      No results found.     This note has been dictated using voice recognition software. Any grammatical or context distortions are unintentional and inherent to the software        Caden King MD

## 2022-02-10 NOTE — LETTER
"    2/10/2022         RE: Debra Mckeon  [address]   [place] [address]        Dear Colleague,    Thank you for referring your patient, Debra Mckeon, to the [facility]. Please see a copy of my visit note below.    Oncology Rooming Note    February 10, 2022 10:32 AM   Debra Mckeon is a 77 year old female who presents for:    Chief Complaint   Patient presents with     Oncology Clinic Visit     Initial Vitals: /66   Pulse 72   Temp 98.2  F (36.8  C)   Resp 16   Wt 58.6 kg (129 lb 1.6 oz)   SpO2 97%   BMI 20.53 kg/m   Estimated body mass index is 20.53 kg/m  as calculated from the following:    Height as of 12/26/17: 1.689 m (5' 6.5\").    Weight as of this encounter: 58.6 kg (129 lb 1.6 oz). Body surface area is 1.66 meters squared.  No Pain (0) Comment: Data Unavailable   No LMP recorded.  Allergies reviewed: Yes  Medications reviewed: Yes    Medications: Medication refills not needed today.  Pharmacy name entered into [employer]: [facility] [address]    Clinical concerns: None       Gladys Junior LPN              [facility] cancer Care Progress Note  Patient: Debra Mckeon   MRN:  [id number]   Date of Service : Feb 10, 2022         Chief complaint      1. Malignant neoplasm of lower-inner quadrant of left breast in female, estrogen receptor positive (H)     2. Osteopenia          Assessment     1. CA breasts left-sided stage II, T2 N1 M0 ER positive MA positive HER-2/khari negative by IHC. Negative margins. One sentinel lymph node positive out of 2. This is a grade 1 tumor. Oncotype score of 6. Finished radiation and started Anastrozole in February 2017. Tolerating it well.  2.  Osteoporosis confirmed in March 2021.  Currently only taking calcium and vitamin D.History fracture of right 8th rib after a fall.  3.  Family history of breast cancer.  Her mother was diagnosed with breast cancer at the same age and "  from metastatic breast cancer a few years later. This has been her worry.  4. Use of some alternative medications for health promotion in preservation.  5. Negative Cologuard test in 2021.  6. History of kidney stone in 2018  7.  Does not believe in Covid vaccine.    Plan     1. She has finished 5 years of anastrozole therapy.  We did discuss the option of continuing longer versus stopping.  The patient is inclined to stop and observe.  2. Continue with good diet and exercise.  3. High but not too high calcium but mostly in diet and Vit d in her diet.  4. Lymphedema prevention interventions..  5. Mammogram yearly.  6. Discussed with the patient that she should consider bisphosphonate therapy for her osteoporosis.  She is already doing some exercises to improve her bone density.  Next bone density in late spring 2023.  7. Advised to take precautions during this pandemic.    MART Mckeon is a 77 year old  female breast cancer located in her left breast measuring 2.2 cm in size presenting on the CT scan that was done to look for pulmonary embolism back in 2016 when she presented with pleuritic chest pain. She was then seen by Dr. Larios who ordered an Onco blot test which apparently was positive. She then had another mammogram in 2016 which was also suggestive of malignancy in the left breast. She underwent biopsy on 2016 which confirmed breast cancer ER positive LA positive HER-2/khari negative. Subsequent to that she was seen by Dr. Powers who performed lumpectomy with sentinel lymph node biopsy on 2016 which showed invasive ductal carcinoma 2.2 cm in size negative margins with one of the 2 sentinel lymph nodes positive for metastatic cancer the size in the lymph node was 9 mm. She recovered well from surgery.     We did Oncotype and it came back low at 6. So she proceeded with radiation that was finished in 2017. Started on Anastrozole in  February 2017.  She is handling it well.  She did have a bone density in 2017 which showed low bone mass/osteopenia.  However subsequent bone density in 2021 showed that she had osteoporosis.  She is only taking calcium and vitamin D supplementation.  She has been talked about taking bisphosphonates but is not sure about it yet.  She is doing some exercises including some weight training to help with her bone density.    She has not had Covid vaccination.  Does not believe in them.    Comes in today for scheduled follow-up.  She is excited that she has finished 5 years of anastrozole therapy.  Looking forward to stopping it.        Review of system      Detailed pertinent 8 system review of systems was done.  Findings noted.      Past Medical, family and social history     Medical History  Active Ambulatory (Non-Hospital) Problems    Diagnosis     Herpes zoster without complication     Malignant neoplasm of lower-inner quadrant of left breast in female, estrogen receptor positive (H)     Osteopenia     Menopause Has Occurred     Past Medical History:   Diagnosis Date     Breast cancer (H) 2016     Celiac disease 2008     Disease of thyroid gland      Hx of radiation therapy 01/2017     Hypothyroid      Melanoma (H) 2012     Shingles      Squamous cell skin cancer, wrist, left 2015       Physical exam      /66   Pulse 72   Temp 98.2  F (36.8  C)   Resp 16   Wt 58.6 kg (129 lb 1.6 oz)   SpO2 97%   BMI 20.53 kg/m       GENERAL: No acute distress. Cooperative in conversation.   HEENT:  Pupils are equal, round and reactive. Oral mucosa is clean and intact. No ulcerations or mucositis noted. No bleeding noted.  RESP:Chest symmetric lungs are clear bilaterally per auscultation. Regular respiratory rate. No wheezes or rhonchi.  CV: Normal S1 S2 Regular, rate and rhythm. No murmurs.    ABD: Nondistended, soft, nontender. Positive bowel sounds. No organomegaly.   EXTREMITIES: No lower extremity edema.   NEURO:  Non- focal. Alert and oriented x3.  Cranial nerves appear intact.  PSYCH: Within normal limits. No depression or anxiety.  SKIN: Warm dry intact.       Labs and radiology      No results found.     This note has been dictated using voice recognition software. Any grammatical or context distortions are unintentional and inherent to the software        Caden King MD           Again, thank you for allowing me to participate in the care of your patient.        Sincerely,        Caden King MD, MD

## 2022-02-11 VITALS
BODY MASS INDEX: 20.47 KG/M2 | HEART RATE: 72 BPM | TEMPERATURE: 97.6 F | HEIGHT: 67 IN | WEIGHT: 130.4 LBS | DIASTOLIC BLOOD PRESSURE: 62 MMHG | SYSTOLIC BLOOD PRESSURE: 108 MMHG

## 2022-02-16 NOTE — PROGRESS NOTES
Patient:   LISSET BRICE            MRN: [id number]            FIN: [id number]               Age:   72 years     Sex:  Female     :  1945   Associated Diagnoses:   Breast cancer, stage 1, estrogen receptor positive; Celiac disease; Hypothyroid   Author:   Alex Olsen MD      Visit Information      Date of Service: 2017 02:52 pm  Performing Location: [facility]  Encounter#: [id number]      Primary Care Provider (PCP):  Alex Olsen MD    NPI# [id number]      Referring Provider:  Alex Olsen MD, NPI# [id number]      Chief Complaint   2017 3:08 PM CDT    Medication check up.              Additional Information:No additional information recorded during visit.   Chief complaint and symptoms as noted above and confirmed with patient.  Recent lab and diagnostic studies reviewed with patient      History of Present Illness   2016: Lisset is presents to clinic to establish care with me.  She was previously seen by Dr. Peña in September.  At that time underwent a CT chest for evaluation of pleuritic-like chest pains and found to have a left sided breast mass.  She opted to pursue further diagnostic workup through Dr. Larios, her functional medicine provider.  She underwent breast biopsy confirming intraductal carcinoma positive for both estrogen and progesterone receptors.  She is scheduled to meet with a breast surgeon this coming Friday.  Hoping to establish care with internist here locally.  She take [place] thyroid related to history of hypothyroidism.       2017: Presents for follow-up.  Underwent lumpectomy and postoperative radiation for left-sided breast cancer.  Currently maintained on anastrozole.  Overall doing well.  Does bring up some arthralgic complaints since starting on anastrozole.  Labs recently done this past week.  She is maintained on [place] Thyroid at the direction of her functional medicine doctor, Dr. Larios.  Maintains uncle blot testing for her cancer  surveillance.  She take Ada thyroid related to history of hypothyroidism.      Review of Systems   Constitutional:  No fever, No chills.    Eye:  Negative except as documented in history of present illness.    Ear/Nose/Mouth/Throat:  Negative except as documented in history of present illness.    Respiratory:  No shortness of breath.    Cardiovascular:  No chest pain, No palpitations, No peripheral edema, No syncope.    Gastrointestinal:  No nausea, No vomiting, No abdominal pain.    Genitourinary:  No dysuria, No hematuria.    Hematology/Lymphatics:  Negative except as documented in history of present illness.    Endocrine:  No excessive thirst, No polyuria.    Immunologic:  No recurrent fevers.    Musculoskeletal:  Muscle pain, No joint pain.    Neurologic:  Alert and oriented X4, No numbness, No tingling, No headache.       Health Status   Allergies:    Allergic Reactions (Selected)  Severity Not Documented  Penicillin (Rash)   Medications:  (Selected)   Prescriptions  Prescribed  Ada Thyroid 30 mg oral tablet: 1 tab(s) ( 30 mg ), po, bid, # 180 tab(s), 3 Refill(s), Type: Maintenance, Pharmacy: [facility], 1 tab(s) po bid  Documented Medications  Documented  anastrozole 1 mg oral tablet: 1 tab(s) ( 1 mg ), PO, Daily, 0 Refill(s), Type: Maintenance   Problem list:    All Problems  Resolved: Pregnancy / SNOMED CT 565193840  Resolved: Pregnancy / SNOMED CT 472070596      Histories   Past Medical History:    Resolved  Pregnancy (636295754):  Resolved in  at 21 years.  Pregnancy (976773919):  Resolved in  at 31 years.   Family History:    Diabetes mellitus type II  Brother  CA - Lung cancer  Brother  Breast cancer  Mother ()  Diabetes mellitus type I  Son (Lei)  Prediabetes  Brother  CA - Cancer  Father ()  Comments:  2016 3:06 PM - Roxana Link  prostate     Procedure history:    Colonoscopy (474203556) in  at 63 Years.  Tonsillectomy (525870197) in  at 19 Years.  [facility]  [facility] ([id number]).   Social History:        Alcohol Assessment            Current, 1-2 times per month, 1 drinks/episode average.      Tobacco Assessment: Denies Tobacco Use      Substance Abuse Assessment: Denies Substance Abuse      Nutrition and Health Assessment            Type of diet: Paleo.      Exercise and Physical Activity Assessment: Regular exercise            Exercise frequency: 5-6 times/week.  Exercise type: Aerobics, Weight lifting, Participates at [employer]        Physical Examination   vital signs stable, as noted above   Vital Signs   8/17/2017 3:08 PM CDT Temperature Tympanic 97.6 DegF  LOW    Peripheral Pulse Rate 72 bpm    HR Method Manual    Systolic Blood Pressure 108 mmHg    Diastolic Blood Pressure 62 mmHg    Mean Arterial Pressure 77 mmHg    BP Method Manual      Measurements from flowsheet : Measurements   8/17/2017 3:08 PM CDT Height Measured - Standard 67 in    Weight Measured - Standard 130.4 lb    BSA 1.67 m2    Body Mass Index 20.42 kg/m2      General:  Alert and oriented, No acute distress.    Eye:  Extraocular movements are intact.    HENT:  Normocephalic, Tympanic membranes are clear, Oral mucosa is moist, No pharyngeal erythema.    Neck:  Supple, No carotid bruit, No jugular venous distention, No lymphadenopathy.    Respiratory:  Lungs are clear to auscultation, Respirations are non-labored.    Cardiovascular:  Normal rate, Regular rhythm, No murmur, No edema.    Gastrointestinal:  Soft, Non-tender, Non-distended, Normal bowel sounds, No organomegaly.    Lymphatics:  No lymphadenopathy neck, axilla, groin.    Musculoskeletal:  Normal range of motion, Normal strength, No tenderness.    Neurologic:  Alert, Oriented, Normal motor function, No focal deficits.    Cognition and Speech:  Oriented, Speech clear and coherent.    Psychiatric:  Appropriate mood & affect.       Review / Management   Results review:  Lab results   8/12/2017 9:40 AM CDT  Sodium Level 140 mmol/L    Potassium Level 4.3 mmol/L    Chloride Level 102 mmol/L    CO2 Level 29 mmol/L    Glucose Level 102 mg/dL  HI    BUN 8 mg/dL    Creatinine 0.73 mg/dL    BUN/Creat Ratio NOT APPLICABLE    eGFR 83 mL/min/1.73m2    eGFR African American 96 mL/min/1.73m2    Calcium Level 10.2 mg/dL    Hgb A1c 5.6    Cholesterol 282 mg/dL  HI    Non-  HI     mg/dL    Chol/HDL Ratio 2.4      HI    Triglyceride 84 mg/dL    T3 Free 3.1 pg/mL    TSH 0.33 mIU/L  LOW   .       Impression and Plan   Diagnosis     Breast cancer, stage 1, estrogen receptor positive (GTS76-UI C50.919).     Celiac disease (TYQ54-DE K90.0).     Hypothyroid (JEX44-ZG E03.9).       .) invasive ductal left breast cancer, diagnosed 9/2016; ER+, HI+, HER2 -   - felt to be stage Ib based on radiographic findings   - completed lumpectomy and XRT in '16; now maintained on anastrazole    .) celiac disease   - diet management; gluten-free    .) hypothyroidism   - maintained on dissicated thyroid supplement    .) health maintenance   - impaired fasting sugar; normal A1c   - last colonoscopy in '08 - Oncoblot testing via Dr. Larios     - recommending annual FIT testing as well   - declines any forms of adult vaccinations   - remote h/o melanoma; semi-annual skin surveillance    RTC annually

## 2022-10-03 ENCOUNTER — HEALTH MAINTENANCE LETTER (OUTPATIENT)
Age: 77
End: 2022-10-03

## 2023-03-16 ENCOUNTER — TELEPHONE (OUTPATIENT)
Dept: ONCOLOGY | Facility: HOSPITAL | Age: 78
End: 2023-03-16
Payer: MEDICARE

## 2023-03-28 ENCOUNTER — ANCILLARY PROCEDURE (OUTPATIENT)
Dept: BONE DENSITY | Facility: CLINIC | Age: 78
End: 2023-03-28
Attending: INTERNAL MEDICINE
Payer: MEDICARE

## 2023-03-28 DIAGNOSIS — M81.0 AGE-RELATED OSTEOPOROSIS WITHOUT CURRENT PATHOLOGICAL FRACTURE: ICD-10-CM

## 2023-03-28 PROCEDURE — 77080 DXA BONE DENSITY AXIAL: CPT | Mod: TC | Performed by: STUDENT IN AN ORGANIZED HEALTH CARE EDUCATION/TRAINING PROGRAM

## 2023-04-20 ENCOUNTER — TRANSFERRED RECORDS (OUTPATIENT)
Dept: HEALTH INFORMATION MANAGEMENT | Facility: CLINIC | Age: 78
End: 2023-04-20

## 2023-05-10 ENCOUNTER — ONCOLOGY VISIT (OUTPATIENT)
Dept: ONCOLOGY | Facility: HOSPITAL | Age: 78
End: 2023-05-10
Attending: INTERNAL MEDICINE
Payer: MEDICARE

## 2023-05-10 ENCOUNTER — PATIENT OUTREACH (OUTPATIENT)
Dept: ONCOLOGY | Facility: HOSPITAL | Age: 78
End: 2023-05-10

## 2023-05-10 VITALS
BODY MASS INDEX: 19.65 KG/M2 | RESPIRATION RATE: 16 BRPM | HEART RATE: 62 BPM | DIASTOLIC BLOOD PRESSURE: 75 MMHG | SYSTOLIC BLOOD PRESSURE: 127 MMHG | OXYGEN SATURATION: 98 % | WEIGHT: 123.6 LBS | TEMPERATURE: 97.6 F

## 2023-05-10 DIAGNOSIS — M81.0 AGE-RELATED OSTEOPOROSIS WITHOUT CURRENT PATHOLOGICAL FRACTURE: ICD-10-CM

## 2023-05-10 DIAGNOSIS — C50.312 MALIGNANT NEOPLASM OF LOWER-INNER QUADRANT OF LEFT BREAST IN FEMALE, ESTROGEN RECEPTOR POSITIVE (H): Primary | ICD-10-CM

## 2023-05-10 DIAGNOSIS — Z17.0 MALIGNANT NEOPLASM OF LOWER-INNER QUADRANT OF LEFT BREAST IN FEMALE, ESTROGEN RECEPTOR POSITIVE (H): Primary | ICD-10-CM

## 2023-05-10 PROCEDURE — G0463 HOSPITAL OUTPT CLINIC VISIT: HCPCS | Performed by: INTERNAL MEDICINE

## 2023-05-10 PROCEDURE — 99214 OFFICE O/P EST MOD 30 MIN: CPT | Performed by: INTERNAL MEDICINE

## 2023-05-10 RX ORDER — THYROID 15 MG/1
TABLET ORAL
COMMUNITY
Start: 2023-04-01

## 2023-05-10 RX ORDER — ESTRADIOL 0.1 MG/G
CREAM VAGINAL
COMMUNITY
Start: 2023-02-13

## 2023-05-10 RX ORDER — METOPROLOL TARTRATE 25 MG/1
TABLET, FILM COATED ORAL
COMMUNITY
Start: 2023-03-21

## 2023-05-10 RX ORDER — DESONIDE 0.5 MG/G
OINTMENT TOPICAL
COMMUNITY
Start: 2023-01-12

## 2023-05-10 ASSESSMENT — PAIN SCALES - GENERAL: PAINLEVEL: NO PAIN (0)

## 2023-05-10 NOTE — LETTER
5/10/2023         RE: Debra Mckeon  [address]  [place] [address]        Dear Colleague,    Thank you for referring your patient, Debra Mckeon, to the [facility] [place]. Please see a copy of my visit note below.    [facility] cancer Care Progress Note  Patient: Debra Mckeon   MRN:  [id number]   Date of Service : May 10, 2023         Chief complaint      Problem List Items Addressed This Visit        Musculoskeletal and Integumentary    Age-related osteoporosis without current pathological fracture       Other    Malignant neoplasm of lower-inner quadrant of left breast in female, estrogen receptor positive (H) - Primary      Assessment     1. CA breasts left-sided stage II, T2 N1 M0 ER positive [place] positive HER-2/khari negative by IHC. Negative margins. One sentinel lymph node positive out of 2. This is a grade 1 tumor. Oncotype score of 6. Finished radiation and started Anastrozole in 2017.  Finished 5 years in 2022.    2.  Osteoporosis confirmed in 2021.  Currently only taking calcium and vitamin D.History fracture of right 8th rib after a fall.  Bone density in 2023 shows osteoporosis however the bone density appears to be marginally better compared to the previous bone density scan.  3.  Family history of breast cancer.  Her mother was diagnosed with breast cancer at the same age and  from metastatic breast cancer a few years later. This has been her worry.  4.  Diagnosed with paroxysmal atrial fibrillation and currently on Eliquis and metoprolol.  5. Negative Cologuard test in 2021.  6. History of kidney stone in 2018      Plan     1. As far as breast cancer is concerned continue with annual mammograms breast exams.  No need for any other therapy at this time.  2. For vaginal dryness continue using estradiol cream that has been prescribed.  However I would advise using 1 mg 3 times a week for a  month and then switching to 1 mg once a week until the current prescription is finished.  After that use over-the-counter lubricants on a daily basis..  3. Continue with calcium rich diet.  Vitamin D supplementation.  4. Follow-up with cardiology..  5. Yearly mammogram.  Tomography is not an adequate substitute as best as we can tell.  6. We will need to keep a close eye on bone density.  Next bone density is due in March 2025.      MART Mckeon is a 78 year old  female breast cancer located in her left breast measuring 2.2 cm in size presenting on the CT scan that was done to look for pulmonary embolism back in June 2016 when she presented with pleuritic chest pain. She was then seen by Dr. Larios who ordered an Onco blot test which apparently was positive. She then had another mammogram in November 2016 which was also suggestive of malignancy in the left breast. She underwent biopsy on November 18, 2016 which confirmed breast cancer ER positive [place] positive HER-2/khari negative. Subsequent to that she was seen by Dr. Powers who performed lumpectomy with sentinel lymph node biopsy on December 8, 2016 which showed invasive ductal carcinoma 2.2 cm in size negative margins with one of the 2 sentinel lymph nodes positive for metastatic cancer the size in the lymph node was 9 mm. She recovered well from surgery.     We did Oncotype and it came back low at 6. So she proceeded with radiation that was finished in Feb 17, 2017. Started on Anastrozole in February 2017.  She is handling it well.  She did have a bone density in 2017 which showed low bone mass/osteopenia.  However subsequent bone density in 2021 showed that she had osteoporosis.  She is only taking calcium and vitamin D supplementation.  She has been talked about taking bisphosphonates but is not sure about it yet.  She is doing some exercises including some weight training to help with her bone density.    She has not had Covid vaccination.  Does not  believe in them.    Comes in today for scheduled follow-up.  She is excited that she has finished 5 years of anastrozole therapy.  Had thermography this year in lieu of mammography.  Appears to be okay.  Diagnosed with PVCs with some atrial fibrillation.  Started on metoprolol and Eliquis.  She was also prescribed some estradiol by Dr. Coyne for vaginal dryness.  Has not been using appropriately.    Review of system      A 14 point review of systems was obtained.  Positive findings noted in the history.  Rest of the review of system is otherwise negative.     Past Medical, family and social history     Medical History  Active Ambulatory (Non-Hospital) Problems    Diagnosis     Herpes zoster without complication     Malignant neoplasm of lower-inner quadrant of left breast in female, estrogen receptor positive (H)     Osteopenia     Menopause Has Occurred     Past Medical History:   Diagnosis Date     Breast cancer (H) 2016     Celiac disease 2008     Disease of thyroid gland      Hx of radiation therapy 01/2017     Hypothyroid      Melanoma (H) 2012     Shingles      Squamous cell skin cancer, wrist, left 2015       Physical exam      /75   Pulse 62   Temp 97.6  F (36.4  C) (Oral)   Resp 16   Wt 56.1 kg (123 lb 9.6 oz)   SpO2 98%   BMI 19.65 kg/m       GENERAL: No acute distress. Cooperative in conversation.   HEENT:  Pupils are equal, round and reactive. Oral mucosa is clean and intact. No ulcerations or mucositis noted. No bleeding noted.  RESP:Chest symmetric lungs are clear bilaterally per auscultation. Regular respiratory rate. No wheezes or rhonchi.  CV: Normal S1 S2 Regular, rate and rhythm. No murmurs.    ABD: Nondistended, soft, nontender. Positive bowel sounds. No organomegaly.   EXTREMITIES: No lower extremity edema.   NEURO: Non- focal. Alert and oriented x3.  Cranial nerves appear intact.  PSYCH: Within normal limits. No depression or anxiety.  SKIN: Warm dry intact.       Labs and  radiology      No results found.     This note has been dictated using voice recognition software. Any grammatical or context distortions are unintentional and inherent to the software        Caden King MD           Again, thank you for allowing me to participate in the care of your patient.        Sincerely,        Caden King MD, MD

## 2023-05-10 NOTE — PATIENT INSTRUCTIONS
Estradiol 1 mg intravaginally with an applicator 3 times a week for 1 month.  After that once a week until the prescription is finished.  After that use over-the-counter vaginal lubricants on a daily basis.

## 2023-05-10 NOTE — PROGRESS NOTES
[facility] cancer Care Progress Note  Patient: Debra Mckeon   MRN:  [id number]   Date of Service : May 10, 2023         Chief complaint      Problem List Items Addressed This Visit        Musculoskeletal and Integumentary    Age-related osteoporosis without current pathological fracture       Other    Malignant neoplasm of lower-inner quadrant of left breast in female, estrogen receptor positive (H) - Primary      Assessment     1. CA breasts left-sided stage II, T2 N1 M0 ER positive [place] positive HER-2/khari negative by IHC. Negative margins. One sentinel lymph node positive out of 2. This is a grade 1 tumor. Oncotype score of 6. Finished radiation and started Anastrozole in 2017.  Finished 5 years in 2022.    2.  Osteoporosis confirmed in 2021.  Currently only taking calcium and vitamin D.History fracture of right 8th rib after a fall.  Bone density in 2023 shows osteoporosis however the bone density appears to be marginally better compared to the previous bone density scan.  3.  Family history of breast cancer.  Her mother was diagnosed with breast cancer at the same age and  from metastatic breast cancer a few years later. This has been her worry.  4.  Diagnosed with paroxysmal atrial fibrillation and currently on Eliquis and metoprolol.  5. Negative Cologuard test in 2021.  6. History of kidney stone in 2018      Plan     1. As far as breast cancer is concerned continue with annual mammograms breast exams.  No need for any other therapy at this time.  2. For vaginal dryness continue using estradiol cream that has been prescribed.  However I would advise using 1 mg 3 times a week for a month and then switching to 1 mg once a week until the current prescription is finished.  After that use over-the-counter lubricants on a daily basis..  3. Continue with calcium rich diet.  Vitamin D supplementation.  4. Follow-up with cardiology..  5. Yearly mammogram.   Tomography is not an adequate substitute as best as we can tell.  6. We will need to keep a close eye on bone density.  Next bone density is due in March 2025.      MART Mckeon is a 78 year old  female breast cancer located in her left breast measuring 2.2 cm in size presenting on the CT scan that was done to look for pulmonary embolism back in June 2016 when she presented with pleuritic chest pain. She was then seen by Dr. Larios who ordered an Onco blot test which apparently was positive. She then had another mammogram in November 2016 which was also suggestive of malignancy in the left breast. She underwent biopsy on November 18, 2016 which confirmed breast cancer ER positive [place] positive HER-2/khari negative. Subsequent to that she was seen by Dr. Powers who performed lumpectomy with sentinel lymph node biopsy on December 8, 2016 which showed invasive ductal carcinoma 2.2 cm in size negative margins with one of the 2 sentinel lymph nodes positive for metastatic cancer the size in the lymph node was 9 mm. She recovered well from surgery.     We did Oncotype and it came back low at 6. So she proceeded with radiation that was finished in Feb 17, 2017. Started on Anastrozole in February 2017.  She is handling it well.  She did have a bone density in 2017 which showed low bone mass/osteopenia.  However subsequent bone density in 2021 showed that she had osteoporosis.  She is only taking calcium and vitamin D supplementation.  She has been talked about taking bisphosphonates but is not sure about it yet.  She is doing some exercises including some weight training to help with her bone density.    She has not had Covid vaccination.  Does not believe in them.    Comes in today for scheduled follow-up.  She is excited that she has finished 5 years of anastrozole therapy.  Had thermography this year in lieu of mammography.  Appears to be okay.  Diagnosed with PVCs with some atrial fibrillation.  Started on  metoprolol and Eliquis.  She was also prescribed some estradiol by Dr. Coyne for vaginal dryness.  Has not been using appropriately.    Review of system      A 14 point review of systems was obtained.  Positive findings noted in the history.  Rest of the review of system is otherwise negative.     Past Medical, family and social history     Medical History  Active Ambulatory (Non-Hospital) Problems    Diagnosis     Herpes zoster without complication     Malignant neoplasm of lower-inner quadrant of left breast in female, estrogen receptor positive (H)     Osteopenia     Menopause Has Occurred     Past Medical History:   Diagnosis Date     Breast cancer (H) 2016     Celiac disease 2008     Disease of thyroid gland      Hx of radiation therapy 01/2017     Hypothyroid      Melanoma (H) 2012     Shingles      Squamous cell skin cancer, wrist, left 2015       Physical exam      /75   Pulse 62   Temp 97.6  F (36.4  C) (Oral)   Resp 16   Wt 56.1 kg (123 lb 9.6 oz)   SpO2 98%   BMI 19.65 kg/m       GENERAL: No acute distress. Cooperative in conversation.   HEENT:  Pupils are equal, round and reactive. Oral mucosa is clean and intact. No ulcerations or mucositis noted. No bleeding noted.  RESP:Chest symmetric lungs are clear bilaterally per auscultation. Regular respiratory rate. No wheezes or rhonchi.  CV: Normal S1 S2 Regular, rate and rhythm. No murmurs.    ABD: Nondistended, soft, nontender. Positive bowel sounds. No organomegaly.   EXTREMITIES: No lower extremity edema.   NEURO: Non- focal. Alert and oriented x3.  Cranial nerves appear intact.  PSYCH: Within normal limits. No depression or anxiety.  SKIN: Warm dry intact.       Labs and radiology      No results found.     This note has been dictated using voice recognition software. Any grammatical or context distortions are unintentional and inherent to the software        Caden King MD

## 2023-10-22 ENCOUNTER — HEALTH MAINTENANCE LETTER (OUTPATIENT)
Age: 78
End: 2023-10-22

## 2024-03-10 ENCOUNTER — HEALTH MAINTENANCE LETTER (OUTPATIENT)
Age: 79
End: 2024-03-10

## 2024-05-22 ENCOUNTER — ONCOLOGY VISIT (OUTPATIENT)
Dept: ONCOLOGY | Facility: HOSPITAL | Age: 79
End: 2024-05-22
Attending: INTERNAL MEDICINE
Payer: MEDICARE

## 2024-05-22 ENCOUNTER — PATIENT OUTREACH (OUTPATIENT)
Dept: ONCOLOGY | Facility: HOSPITAL | Age: 79
End: 2024-05-22

## 2024-05-22 ENCOUNTER — LAB (OUTPATIENT)
Dept: INFUSION THERAPY | Facility: HOSPITAL | Age: 79
End: 2024-05-22
Attending: INTERNAL MEDICINE
Payer: MEDICARE

## 2024-05-22 VITALS
WEIGHT: 127.1 LBS | DIASTOLIC BLOOD PRESSURE: 66 MMHG | HEIGHT: 66 IN | TEMPERATURE: 97.9 F | RESPIRATION RATE: 16 BRPM | SYSTOLIC BLOOD PRESSURE: 149 MMHG | BODY MASS INDEX: 20.43 KG/M2 | OXYGEN SATURATION: 98 % | HEART RATE: 70 BPM

## 2024-05-22 DIAGNOSIS — Z17.0 MALIGNANT NEOPLASM OF LOWER-INNER QUADRANT OF LEFT BREAST IN FEMALE, ESTROGEN RECEPTOR POSITIVE (H): Primary | ICD-10-CM

## 2024-05-22 DIAGNOSIS — C50.312 MALIGNANT NEOPLASM OF LOWER-INNER QUADRANT OF LEFT BREAST IN FEMALE, ESTROGEN RECEPTOR POSITIVE (H): ICD-10-CM

## 2024-05-22 DIAGNOSIS — Z17.0 MALIGNANT NEOPLASM OF LOWER-INNER QUADRANT OF LEFT BREAST IN FEMALE, ESTROGEN RECEPTOR POSITIVE (H): ICD-10-CM

## 2024-05-22 DIAGNOSIS — M81.0 OSTEOPOROSIS WITHOUT CURRENT PATHOLOGICAL FRACTURE, UNSPECIFIED OSTEOPOROSIS TYPE: ICD-10-CM

## 2024-05-22 DIAGNOSIS — C50.312 MALIGNANT NEOPLASM OF LOWER-INNER QUADRANT OF LEFT BREAST IN FEMALE, ESTROGEN RECEPTOR POSITIVE (H): Primary | ICD-10-CM

## 2024-05-22 DIAGNOSIS — M81.0 AGE-RELATED OSTEOPOROSIS WITHOUT CURRENT PATHOLOGICAL FRACTURE: ICD-10-CM

## 2024-05-22 LAB
ALBUMIN SERPL BCG-MCNC: 4.4 G/DL (ref 3.5–5.2)
ALP SERPL-CCNC: 85 U/L (ref 40–150)
ALT SERPL W P-5'-P-CCNC: 17 U/L (ref 0–50)
ANION GAP SERPL CALCULATED.3IONS-SCNC: 8 MMOL/L (ref 7–15)
AST SERPL W P-5'-P-CCNC: 26 U/L (ref 0–45)
BILIRUB SERPL-MCNC: 0.5 MG/DL
BUN SERPL-MCNC: 12.7 MG/DL (ref 8–23)
CALCIUM SERPL-MCNC: 9.4 MG/DL (ref 8.8–10.2)
CHLORIDE SERPL-SCNC: 104 MMOL/L (ref 98–107)
CREAT SERPL-MCNC: 0.68 MG/DL (ref 0.51–0.95)
DEPRECATED HCO3 PLAS-SCNC: 29 MMOL/L (ref 22–29)
EGFRCR SERPLBLD CKD-EPI 2021: 89 ML/MIN/1.73M2
GLUCOSE SERPL-MCNC: 114 MG/DL (ref 70–99)
POTASSIUM SERPL-SCNC: 4.3 MMOL/L (ref 3.4–5.3)
PROT SERPL-MCNC: 7 G/DL (ref 6.4–8.3)
SODIUM SERPL-SCNC: 141 MMOL/L (ref 135–145)

## 2024-05-22 PROCEDURE — 84295 ASSAY OF SERUM SODIUM: CPT

## 2024-05-22 PROCEDURE — 99214 OFFICE O/P EST MOD 30 MIN: CPT | Performed by: INTERNAL MEDICINE

## 2024-05-22 PROCEDURE — G2211 COMPLEX E/M VISIT ADD ON: HCPCS | Performed by: INTERNAL MEDICINE

## 2024-05-22 PROCEDURE — 36415 COLL VENOUS BLD VENIPUNCTURE: CPT

## 2024-05-22 PROCEDURE — G0463 HOSPITAL OUTPT CLINIC VISIT: HCPCS | Performed by: INTERNAL MEDICINE

## 2024-05-22 ASSESSMENT — PAIN SCALES - GENERAL: PAINLEVEL: NO PAIN (0)

## 2024-05-22 NOTE — PROGRESS NOTES
"Oncology Rooming Note    May 22, 2024 8:56 AM   Debra Mckeon is a 79 year old female who presents for:    Chief Complaint   Patient presents with    Oncology Clinic Visit     Malignant neoplasm of lower-inner quadrant of left breast in female, estrogen receptor positive (H)     Initial Vitals: BP (!) 149/66   Pulse 70   Temp 97.9  F (36.6  C)   Resp 16   Ht 1.676 m (5' 6\")   Wt 57.7 kg (127 lb 1.6 oz)   SpO2 98%   BMI 20.51 kg/m   Estimated body mass index is 20.51 kg/m  as calculated from the following:    Height as of this encounter: 1.676 m (5' 6\").    Weight as of this encounter: 57.7 kg (127 lb 1.6 oz). Body surface area is 1.64 meters squared.  No Pain (0) Comment: Data Unavailable   No LMP recorded.  Allergies reviewed: Yes  Medications reviewed: Yes    Medications: Medication refills not needed today.  Pharmacy name entered into [employer]: [facility][id number] - [place] [address]  AT [facility] [address]    Frailty Screening:   Is the patient here for a new oncology consult visit in cancer care? 2. No      Clinical concerns: None      Gladys Junior LPN             "

## 2024-05-22 NOTE — PROGRESS NOTES
"[facility]                                                                                            Situation: Patient chart reviewed by care coordinator.    Background: Patient with a history of left breast cancer    Assessment: Patient comes in today for yearly follow-up lab and to see Dr King.    Plan/Recommendations: After the visit it was determined that patient will continue on yearly follow-up.  I have \"resolved\" her from cancer care coordination and \"graduated\" her from enrollment.    Signature:  Elzbieta Lewis RN    "

## 2024-05-22 NOTE — LETTER
2024         RE: Debra Mckeon  [address]  [place] [address]        Dear Colleague,    Thank you for referring your patient, Debra Mckeon, to the [facility]. Please see a copy of my visit note below.    [facility] cancer Care Progress Note  Patient: Debra Mckeon   MRN:  [id number]   Date of Service : May 22, 2024         Chief complaint      Problem List Items Addressed This Visit          Other    Malignant neoplasm of lower-inner quadrant of left breast in female, estrogen receptor positive (H) - Primary    Relevant Orders    Comprehensive metabolic panel (BMP + Alb, Alk Phos, ALT, AST, Total. Bili, TP)     Other Visit Diagnoses       Osteoporosis without current pathological fracture, unspecified osteoporosis type        Relevant Orders    DX Bone Density             Assessment     1. CA breasts left-sided stage II, T2 N1 M0 ER positive [place] positive HER-2/khari negative by IHC. Negative margins. One sentinel lymph node positive out of 2. This is a grade 1 tumor. Oncotype score of 6. Finished radiation and started Anastrozole in 2017.  Finished 5 years in 2022.  Currently on observation.  Seems to be doing well.  2.  Osteoporosis confirmed in 2021.  Currently only taking calcium and vitamin D.History fracture of right 8th rib after a fall.  Bone density in 2023 shows osteoporosis however the bone density appears to be marginally better compared to the previous bone density scan.  3.  Family history of breast cancer.  Her mother was diagnosed with breast cancer at the same age and  from metastatic breast cancer a few years later. This has been her worry.  4.  Diagnosed with paroxysmal atrial fibrillation and was on Eliquis and metoprolol.  However recently she has been weaned off of it.  5. Negative Cologuard test in 2021.  6. History of kidney stone in 2018      Plan     1. As far as breast  cancer is concerned continue with annual mammograms breast exams.  No need for any other therapy at this time.  2. Mild osteoporosis and its treatment discussed.  She is going to be getting another bone density next year.  3. Continue with calcium rich diet.  Vitamin D supplementation.  4. Follow-up with cardiology for heart monitoring.  She is seeing some preventive cardiology physician in [place].  5. Yearly mammogram is recommended.  Debra is concerned about radiation exposure.  We did talk about MRI as well.  However she is going to do some thermography type of imaging.  I am not sure how well that has been studied in its importance as a surveillance to.  6. The longitudinal plan of care for the diagnosis(es)/condition(s) as documented were addressed during this visit. Due to the added complexity in care, I will continue to support Debra in the subsequent management and with ongoing continuity of care.     Patient presenting with follow-up for breast cancer as well as osteoporosis.  Reviewed notes from each unique source.  Reviewed each unique test.  Ordered tests if indicated.  Independently interpreted the EKG performed by her cardiologist and radiological exams performed by other physicians.  Personally reviewed the images.    MART Mckeon is a 79 year old  female breast cancer located in her left breast measuring 2.2 cm in size presenting on the CT scan that was done to look for pulmonary embolism back in June 2016 when she presented with pleuritic chest pain. She was then seen by Dr. Larios who ordered an Onco blot test which apparently was positive. She then had another mammogram in November 2016 which was also suggestive of malignancy in the left breast. She underwent biopsy on November 18, 2016 which confirmed breast cancer ER positive [place] positive HER-2/khari negative. Subsequent to that she was seen by Dr. Powers who performed lumpectomy with sentinel lymph node biopsy on December 8, 2016 which  showed invasive ductal carcinoma 2.2 cm in size negative margins with one of the 2 sentinel lymph nodes positive for metastatic cancer the size in the lymph node was 9 mm. She recovered well from surgery.     We did Oncotype and it came back low at 6. So she proceeded with radiation that was finished in Feb 17, 2017. Started on Anastrozole in February 2017.  She is handling it well.  She did have a bone density in 2017 which showed low bone mass/osteopenia.  However subsequent bone density in 2021 showed that she had osteoporosis.  She is only taking calcium and vitamin D supplementation.  She has been talked about taking bisphosphonates but is not sure about it yet.  She is doing some exercises including some weight training to help with her bone density.    She has not had Covid vaccination.  Does not believe in them.    She was diagnosed with PVCs and some atrial fibrillation couple of years ago.  Was put on metoprolol and Eliquis.  Eventually Debra saw another cardiologist who has now weaned her off of those medications.  She is also modified her diet a little bit.  She follows up with another naturopathic type of a physician in [facility].  Has not had any mammography but has had tomography for breast screening purposes.  Did have a bone density scan done in 2023.    Comes in today for scheduled follow-up.  Overall feels reasonably well.    Review of system      A detailed review of systems was obtained.  Positive findings noted in the history.  Rest of the review of system is otherwise negative.     Past Medical, family and social history     Medical History  Active Ambulatory (Non-Hospital) Problems    Diagnosis     Herpes zoster without complication     Malignant neoplasm of lower-inner quadrant of left breast in female, estrogen receptor positive (H)     Osteopenia     Menopause Has Occurred     Past Medical History:   Diagnosis Date     Breast cancer (H) 2016     Celiac disease 2008     Disease of  "thyroid gland      Hx of radiation therapy 01/2017     Hypothyroid      Melanoma (H) 2012     Shingles      Squamous cell skin cancer, wrist, left 2015       Physical exam      BP (!) 149/66   Pulse 70   Temp 97.9  F (36.6  C)   Resp 16   Ht 1.676 m (5' 6\")   Wt 57.7 kg (127 lb 1.6 oz)   SpO2 98%   BMI 20.51 kg/m       GENERAL: no acute distress. Cooperative in conversation.   HEENT: pupils are equal, round and reactive. Oral mucosa is moist and intact.  RESP:Chest symmetric. Regular respiratory rate. No stridor.  ABD: Nondistended, soft.  EXTREMITIES: No lower extremity edema.   NEURO: non focal. Alert and oriented x3.   PSYCH: within normal limits. No depression or anxiety.  SKIN: warm dry intact   Breast exam: Bilateral breast imaging was done by Ashwini.  No lumbar or abnormal mass felt.  Bilateral axillary examination performed by me which was negative.    Labs and radiology      No results found.     This note has been dictated using voice recognition software. Any grammatical or context distortions are unintentional and inherent to the software        Caden King MD         Oncology Rooming Note    May 22, 2024 8:56 AM   Debra JASMIN Mike is a 79 year old female who presents for:    Chief Complaint   Patient presents with     Oncology Clinic Visit     Malignant neoplasm of lower-inner quadrant of left breast in female, estrogen receptor positive (H)     Initial Vitals: BP (!) 149/66   Pulse 70   Temp 97.9  F (36.6  C)   Resp 16   Ht 1.676 m (5' 6\")   Wt 57.7 kg (127 lb 1.6 oz)   SpO2 98%   BMI 20.51 kg/m   Estimated body mass index is 20.51 kg/m  as calculated from the following:    Height as of this encounter: 1.676 m (5' 6\").    Weight as of this encounter: 57.7 kg (127 lb 1.6 oz). Body surface area is 1.64 meters squared.  No Pain (0) Comment: Data Unavailable   No LMP recorded.  Allergies reviewed: Yes  Medications reviewed: Yes    Medications: Medication refills not needed " today.  Pharmacy name entered into [employer]: [facility] #[id number] - [place], [place] - [address]  AT [facility] [address]    Frailty Screening:   Is the patient here for a new oncology consult visit in cancer care? 2. No      Clinical concerns: None      Gladys Junior LPN                 Again, thank you for allowing me to participate in the care of your patient.        Sincerely,        Caden King MD

## 2024-05-22 NOTE — PROGRESS NOTES
[facility] cancer Care Progress Note  Patient: Debra Mckeon   MRN:  [id number]   Date of Service : May 22, 2024         Chief complaint      Problem List Items Addressed This Visit          Other    Malignant neoplasm of lower-inner quadrant of left breast in female, estrogen receptor positive (H) - Primary    Relevant Orders    Comprehensive metabolic panel (BMP + Alb, Alk Phos, ALT, AST, Total. Bili, TP)     Other Visit Diagnoses       Osteoporosis without current pathological fracture, unspecified osteoporosis type        Relevant Orders    DX Bone Density             Assessment     1. CA breasts left-sided stage II, T2 N1 M0 ER positive NJ positive HER-2/khari negative by IHC. Negative margins. One sentinel lymph node positive out of 2. This is a grade 1 tumor. Oncotype score of 6. Finished radiation and started Anastrozole in 2017.  Finished 5 years in 2022.  Currently on observation.  Seems to be doing well.  2.  Osteoporosis confirmed in 2021.  Currently only taking calcium and vitamin D.History fracture of right 8th rib after a fall.  Bone density in 2023 shows osteoporosis however the bone density appears to be marginally better compared to the previous bone density scan.  3.  Family history of breast cancer.  Her mother was diagnosed with breast cancer at the same age and  from metastatic breast cancer a few years later. This has been her worry.  4.  Diagnosed with paroxysmal atrial fibrillation and was on Eliquis and metoprolol.  However recently she has been weaned off of it.  5. Negative Cologuard test in 2021.  6. History of kidney stone in 2018      Plan     1. As far as breast cancer is concerned continue with annual mammograms breast exams.  No need for any other therapy at this time.  2. Mild osteoporosis and its treatment discussed.  She is going to be getting another bone density next year.  3. Continue with calcium rich diet.  Vitamin  D supplementation.  4. Follow-up with cardiology for heart monitoring.  She is seeing some preventive cardiology physician in [place].  5. Yearly mammogram is recommended.  Debra is concerned about radiation exposure.  We did talk about MRI as well.  However she is going to do some thermography type of imaging.  I am not sure how well that has been studied in its importance as a surveillance to.  6. The longitudinal plan of care for the diagnosis(es)/condition(s) as documented were addressed during this visit. Due to the added complexity in care, I will continue to support Debra in the subsequent management and with ongoing continuity of care.     Patient presenting with follow-up for breast cancer as well as osteoporosis.  Reviewed notes from each unique source.  Reviewed each unique test.  Ordered tests if indicated.  Independently interpreted the EKG performed by her cardiologist and radiological exams performed by other physicians.  Personally reviewed the images.    MART Mckeon is a 79 year old  female breast cancer located in her left breast measuring 2.2 cm in size presenting on the CT scan that was done to look for pulmonary embolism back in June 2016 when she presented with pleuritic chest pain. She was then seen by Dr. Larios who ordered an Onco blot test which apparently was positive. She then had another mammogram in November 2016 which was also suggestive of malignancy in the left breast. She underwent biopsy on November 18, 2016 which confirmed breast cancer ER positive NE positive HER-2/khari negative. Subsequent to that she was seen by Dr. Powers who performed lumpectomy with sentinel lymph node biopsy on December 8, 2016 which showed invasive ductal carcinoma 2.2 cm in size negative margins with one of the 2 sentinel lymph nodes positive for metastatic cancer the size in the lymph node was 9 mm. She recovered well from surgery.     We did Oncotype and it came back low at 6. So she  "proceeded with radiation that was finished in Feb 17, 2017. Started on Anastrozole in February 2017.  She is handling it well.  She did have a bone density in 2017 which showed low bone mass/osteopenia.  However subsequent bone density in 2021 showed that she had osteoporosis.  She is only taking calcium and vitamin D supplementation.  She has been talked about taking bisphosphonates but is not sure about it yet.  She is doing some exercises including some weight training to help with her bone density.    She has not had Covid vaccination.  Does not believe in them.    She was diagnosed with PVCs and some atrial fibrillation couple of years ago.  Was put on metoprolol and Eliquis.  Eventually Debra saw another cardiologist who has now weaned her off of those medications.  She is also modified her diet a little bit.  She follows up with another naturopathic type of a physician in [facility].  Has not had any mammography but has had tomography for breast screening purposes.  Did have a bone density scan done in 2023.    Comes in today for scheduled follow-up.  Overall feels reasonably well.    Review of system      A detailed review of systems was obtained.  Positive findings noted in the history.  Rest of the review of system is otherwise negative.     Past Medical, family and social history     Medical History  Active Ambulatory (Non-Hospital) Problems    Diagnosis    Herpes zoster without complication    Malignant neoplasm of lower-inner quadrant of left breast in female, estrogen receptor positive (H)    Osteopenia    Menopause Has Occurred     Past Medical History:   Diagnosis Date    Breast cancer (H) 2016    Celiac disease 2008    Disease of thyroid gland     Hx of radiation therapy 01/2017    Hypothyroid     Melanoma (H) 2012    Shingles     Squamous cell skin cancer, wrist, left 2015       Physical exam      BP (!) 149/66   Pulse 70   Temp 97.9  F (36.6  C)   Resp 16   Ht 1.676 m (5' 6\")   Wt 57.7 " kg (127 lb 1.6 oz)   SpO2 98%   BMI 20.51 kg/m       GENERAL: no acute distress. Cooperative in conversation.   HEENT: pupils are equal, round and reactive. Oral mucosa is moist and intact.  RESP:Chest symmetric. Regular respiratory rate. No stridor.  ABD: Nondistended, soft.  EXTREMITIES: No lower extremity edema.   NEURO: non focal. Alert and oriented x3.   PSYCH: within normal limits. No depression or anxiety.  SKIN: warm dry intact   Breast exam: Bilateral breast imaging was done by Ashwini.  No lumbar or abnormal mass felt.  Bilateral axillary examination performed by me which was negative.    Labs and radiology      No results found.     This note has been dictated using voice recognition software. Any grammatical or context distortions are unintentional and inherent to the software        Caden King MD

## 2024-08-21 NOTE — TELEPHONE ENCOUNTER
Called patient to see if she had made a decision after reviewing the drug information. Patient reports that she did receive the information and wants to review these options with her integrative medicine physician Dr Larios with her upcoming appt. She will contact our office if she would like to proceed with one of these medications. Patient expressed appreciation of the follow-up call. Gladys Sadler, [facility]      Stable.

## 2024-12-14 ENCOUNTER — HEALTH MAINTENANCE LETTER (OUTPATIENT)
Age: 79
End: 2024-12-14

## 2025-03-16 ENCOUNTER — HEALTH MAINTENANCE LETTER (OUTPATIENT)
Age: 80
End: 2025-03-16

## 2025-04-30 ENCOUNTER — ANCILLARY PROCEDURE (OUTPATIENT)
Dept: BONE DENSITY | Facility: CLINIC | Age: 80
End: 2025-04-30
Attending: INTERNAL MEDICINE
Payer: MEDICARE

## 2025-04-30 DIAGNOSIS — M81.0 OSTEOPOROSIS WITHOUT CURRENT PATHOLOGICAL FRACTURE, UNSPECIFIED OSTEOPOROSIS TYPE: ICD-10-CM

## 2025-04-30 PROCEDURE — 77081 DXA BONE DENSITY APPENDICULR: CPT | Mod: TC | Performed by: PHYSICIAN ASSISTANT

## 2025-04-30 PROCEDURE — 77091 TBS TECHL CALCULATION ONLY: CPT | Performed by: PHYSICIAN ASSISTANT

## 2025-04-30 PROCEDURE — 77080 DXA BONE DENSITY AXIAL: CPT | Mod: TC | Performed by: PHYSICIAN ASSISTANT

## 2025-05-08 ENCOUNTER — LAB (OUTPATIENT)
Dept: INFUSION THERAPY | Facility: HOSPITAL | Age: 80
End: 2025-05-08
Attending: INTERNAL MEDICINE
Payer: MEDICARE

## 2025-05-08 ENCOUNTER — ONCOLOGY VISIT (OUTPATIENT)
Dept: ONCOLOGY | Facility: HOSPITAL | Age: 80
End: 2025-05-08
Attending: INTERNAL MEDICINE
Payer: MEDICARE

## 2025-05-08 VITALS
OXYGEN SATURATION: 98 % | TEMPERATURE: 97.4 F | DIASTOLIC BLOOD PRESSURE: 69 MMHG | RESPIRATION RATE: 16 BRPM | HEART RATE: 68 BPM | BODY MASS INDEX: 21.2 KG/M2 | WEIGHT: 131.9 LBS | HEIGHT: 66 IN | SYSTOLIC BLOOD PRESSURE: 119 MMHG

## 2025-05-08 DIAGNOSIS — M81.0 AGE-RELATED OSTEOPOROSIS WITHOUT CURRENT PATHOLOGICAL FRACTURE: Primary | ICD-10-CM

## 2025-05-08 DIAGNOSIS — C50.312 MALIGNANT NEOPLASM OF LOWER-INNER QUADRANT OF LEFT BREAST IN FEMALE, ESTROGEN RECEPTOR POSITIVE (H): ICD-10-CM

## 2025-05-08 DIAGNOSIS — Z17.0 MALIGNANT NEOPLASM OF LOWER-INNER QUADRANT OF LEFT BREAST IN FEMALE, ESTROGEN RECEPTOR POSITIVE (H): ICD-10-CM

## 2025-05-08 LAB
ALBUMIN SERPL BCG-MCNC: 4.3 G/DL (ref 3.5–5.2)
ALP SERPL-CCNC: 79 U/L (ref 40–150)
ALT SERPL W P-5'-P-CCNC: 19 U/L (ref 0–50)
ANION GAP SERPL CALCULATED.3IONS-SCNC: 8 MMOL/L (ref 7–15)
AST SERPL W P-5'-P-CCNC: 26 U/L (ref 0–45)
BILIRUB SERPL-MCNC: 0.7 MG/DL
BUN SERPL-MCNC: 12.9 MG/DL (ref 8–23)
CALCIUM SERPL-MCNC: 9 MG/DL (ref 8.8–10.4)
CHLORIDE SERPL-SCNC: 104 MMOL/L (ref 98–107)
CREAT SERPL-MCNC: 0.72 MG/DL (ref 0.51–0.95)
EGFRCR SERPLBLD CKD-EPI 2021: 85 ML/MIN/1.73M2
GLUCOSE SERPL-MCNC: 113 MG/DL (ref 70–99)
HCO3 SERPL-SCNC: 30 MMOL/L (ref 22–29)
POTASSIUM SERPL-SCNC: 4.6 MMOL/L (ref 3.4–5.3)
PROT SERPL-MCNC: 6.7 G/DL (ref 6.4–8.3)
SODIUM SERPL-SCNC: 142 MMOL/L (ref 135–145)

## 2025-05-08 PROCEDURE — 36415 COLL VENOUS BLD VENIPUNCTURE: CPT

## 2025-05-08 PROCEDURE — G0463 HOSPITAL OUTPT CLINIC VISIT: HCPCS | Performed by: NURSE PRACTITIONER

## 2025-05-08 PROCEDURE — 84155 ASSAY OF PROTEIN SERUM: CPT

## 2025-05-08 ASSESSMENT — PAIN SCALES - GENERAL: PAINLEVEL_OUTOF10: NO PAIN (0)

## 2025-05-08 NOTE — PROGRESS NOTES
"Oncology Rooming Note    May 8, 2025 10:50 AM   Debra Mckeon is a 80 year old female who presents for:    Chief Complaint   Patient presents with    Oncology Clinic Visit     1 year follow up labs and dexa     Initial Vitals: There were no vitals taken for this visit. Estimated body mass index is 20.51 kg/m  as calculated from the following:    Height as of 5/22/24: 1.676 m (5' 6\").    Weight as of 5/22/24: 57.7 kg (127 lb 1.6 oz). There is no height or weight on file to calculate BSA.  Data Unavailable Comment: Data Unavailable   No LMP recorded.  Allergies reviewed: Yes  Medications reviewed: Yes    Medications: Medication refills not needed today.  Pharmacy name entered into [employer]: [id number] [place] [address] AT [facility] & [address]    Frailty Screening:   Is the patient here for a new oncology consult visit in cancer care? 2. No    PHQ9:  Did this patient require a PHQ9?: No      Clinical concerns: none       Daly Blackmon CMA            "

## 2025-05-08 NOTE — LETTER
"5/8/2025      Debra Mckeon  [address]   [place] [address]      Dear Colleague,    Thank you for referring your patient, Debra Mckeon, to the [facility] [place]. Please see a copy of my visit note below.    Oncology Rooming Note    May 8, 2025 10:50 AM   Debra Mckeon is a 80 year old female who presents for:    Chief Complaint   Patient presents with     Oncology Clinic Visit     1 year follow up labs and dexa     Initial Vitals: There were no vitals taken for this visit. Estimated body mass index is 20.51 kg/m  as calculated from the following:    Height as of 5/22/24: 1.676 m (5' 6\").    Weight as of 5/22/24: 57.7 kg (127 lb 1.6 oz). There is no height or weight on file to calculate BSA.  Data Unavailable Comment: Data Unavailable   No LMP recorded.  Allergies reviewed: Yes  Medications reviewed: Yes    Medications: Medication refills not needed today.  Pharmacy name entered into [employer]: [facility] #[id number] - [place], [place] - [address] AT [facility] & [address] [facility]    Frailty Screening:   Is the patient here for a new oncology consult visit in cancer care? 2. No    PHQ9:  Did this patient require a PHQ9?: No      Clinical concerns: none       Daly Blackmon, [facility] Hematology and Oncology Progress Note    Patient: Debra Mckeon  MRN: [id number]  Date of Service: May 8, 2025          Reason for Visit    Chief Complaint   Patient presents with     Oncology Clinic Visit     1 year follow up labs and dexa       Assessment and Plan     Cancer Staging   Malignant neoplasm of lower-inner quadrant of left breast in female, estrogen receptor positive (H)  Staging form: Breast, AJCC 7th Edition  - Pathologic stage from 11/18/2016: Stage IIB (T2, N1a, cM0) - Signed by Porsche Gary APRN CNP on 9/8/2021  ER Status: Positive  NC Status: Positive  HER2 Status: Negative      1. Breast cancer, stage IIB: pt was been on hormonal therapy " from February 2017 until 2022.  Patient has not had any signs of recurrence since that time.  She was worried about radiation exposure with mammogram so actually started doing thermal imaging instead.  States that she has been doing that every year and that has been looking fine.  Clinically she has no signs of disease recurrence.  Patient would like to continue following up.  She will see Dr. King in 1 year.  I did tell her we will likely follow her for up to 10 years.     2. Osteoporosis: Patient states that she is careful about walking.  She has not had any falls or fractures over the last year or 2.  She does try to get calcium and vitamin D.  She is active.  Her latest bone density scan is overall stable.  It does show that her lumbar spine is 10% better but that likely is due to some artifact from arthritis.  No change to her current regimen.    ECOG Performance    0 - Independent    Distress Screening (within last 30 days)    1. How concerned are you about your ability to eat? : 0  2. How concerned are you about unintended weight loss or your current weight? : 0  3. How concerned are you about feeling depressed or very sad? : 0  4. How concerned are you about feeling anxious or very scared? : 0  5. Do you struggle with the loss of meaning and dakotah in your life? : Not at all  6. How concerned are you about work and home life issues that may be affected by your cancer? : 0  7. How concerned are you about knowing what resources are available to help you? : 0  8. Do you currently have what you would describe as Confucianist or spiritual struggles?            : Not at all       Pain  Pain Score: No Pain (0)    Problem List    Patient Active Problem List   Diagnosis     Osteopenia     Menopause Has Occurred     Malignant neoplasm of lower-inner quadrant of left breast in female, estrogen receptor positive (H)     Herpes zoster without complication     Age-related osteoporosis without current pathological fracture     "    ______________________________________________________________________________    History of Present Illness    Debra Mckeon is a 80 y.o. female breast cancer located in her left breast measuring 2.2 cm in size presenting on the CT scan that was done to look for pulmonary embolism back in June 2016 when she presented with pleuritic chest pain. She was then seen by Dr. Larios who ordered an Onco blot test which apparently was positive. She then had another mammogram in November 2016 which was also suggestive of malignancy in the left breast. She underwent biopsy on November 18, 2016 which confirmed breast cancer ER positive MS positive HER-2/khari negative. Subsequent to that she was seen by Dr. Powers who performed lumpectomy with sentinel lymph node biopsy on December 8, 2016 which showed invasive ductal carcinoma 2.2 cm in size negative margins with one of the 2 sentinel lymph nodes positive for metastatic cancer the size in the lymph node was 9 mm. She recovered well from surgery.     We did Oncotype and it came back low at 6. So she has proceeded with radiation that was finished in Feb 17, 2017. Started on Anastrozole in February 2017.  Completed that in February 2022.  She is been off medication now and really has been doing quite well.  She denies any changes in her breast.  Denies any unexplained weight loss.  Denies any new bone or back pain issues.       Review of Systems    Pertinent items are noted in HPI.    Past History    Past Medical History:   Diagnosis Date     Breast cancer (H) 2016     Celiac disease 2008     Disease of thyroid gland      Hx of radiation therapy 01/2017     Hypothyroid      Melanoma (H) 2012    on nose     Shingles      Squamous cell skin cancer, wrist, left 2015       PHYSICAL EXAM:  /69 (BP Location: Right arm, Patient Position: Sitting, Cuff Size: Adult Regular)   Pulse 68   Temp 97.4  F (36.3  C) (Oral)   Resp 16   Ht 1.676 m (5' 6\")   Wt 59.8 kg (131 lb 14.4 " oz)   SpO2 98%   BMI 21.29 kg/m    GENERAL: no acute distress. Cooperative in conversation. Here alone  MUSCULOSKELETAL: No lower extremity swelling.   NEURO: non focal. Alert and oriented x3.   PSYCH: within normal limits. No depression or anxiety.  SKIN: warm dry intact   LYMPH: no cervical, supraclavicular or axillary lymphadenopathy  BREAST: Bilateral exam done.  Lumpectomy incision is well-healed.  Very slight scarring.  No abnormal lesions or rashes noted.  No evidence for local recurrence bilaterally.        Lab Results    Recent Results (from the past week)   Comprehensive metabolic panel (BMP + Alb, Alk Phos, ALT, AST, Total. Bili, TP)   Result Value Ref Range    Sodium 142 135 - 145 mmol/L    Potassium 4.6 3.4 - 5.3 mmol/L    Carbon Dioxide (CO2) 30 (H) 22 - 29 mmol/L    Anion Gap 8 7 - 15 mmol/L    Urea Nitrogen 12.9 8.0 - 23.0 mg/dL    Creatinine 0.72 0.51 - 0.95 mg/dL    GFR Estimate 85 >60 mL/min/1.73m2    Calcium 9.0 8.8 - 10.4 mg/dL    Chloride 104 98 - 107 mmol/L    Glucose 113 (H) 70 - 99 mg/dL    Alkaline Phosphatase 79 40 - 150 U/L    AST 26 0 - 45 U/L    ALT 19 0 - 50 U/L    Protein Total 6.7 6.4 - 8.3 g/dL    Albumin 4.3 3.5 - 5.2 g/dL    Bilirubin Total 0.7 <=1.2 mg/dL       Imaging    DX Bone Density  Result Date: 5/1/2025  EXAM: DX TBS AXIAL and PERIPHERAL LOCATION: [facility] DATE: 4/30/2025 INDICATION: BMD Screening. Follow up. History of fracture in adult life. DEMOGRAPHICS: Age- 80 years. Gender- Female. Menopausal status- Postmenopausal. COMPARISON: 3/28/2023 TECHNIQUE: Dual-energy x-ray absorptiometry (DXA) performed with routine technique. Forearm DXA performed.  Trabecular bone score (TBS) analysis performed. FINDINGS: DXA RESULTS -Lumbar Spine: L1-L4: BMD: 1.146 g/cm2. T-score: -0.3. Z-score: 1.5. Degenerative change may artifactually increase BMD. -RIGHT Hip Total: BMD: 0.745 g/cm2. T-score: -2.1. Z-score: -0.1. -RIGHT Hip Femoral neck: BMD: 0.724 g/cm2.  T-score: -2.3. Z-score: -0.2. -LEFT Hip Total: BMD: 0.728 g/cm2. T-score: -2.2. Z-score: -0.3. -LEFT Hip Femoral neck: BMD: 0.684 g/cm2. T-score: -2.5. Z-score: -0.4. -LEFT Radius 33%: BMD: 0.545 g/cm2. T-score: -3.8. Z-score: -1.1. WHO T-SCORE CRITERIA -Normal: T score at or above -1 SD -Osteopenia: T score between -1 and -2.5 SD -Osteoporosis: T score at or below -2.5 SD The World Health Organization (WHO) criteria is applicable to perimenopausal females, postmenopausal females, and men aged 50 years or older. TBS RESULTS -Lumbar Spine L1-L4: TBS: 1.293. TBS T-score: -1.9.TBS Z-score: 0.8. The TBS is a DXA derived measurement for fracture risk assessment, and reflects the structural condition of the bone microarchitecture. It can be used to adjust WHO Fracture Risk Assessment Tool (FRAX) probability of fracture in postmenopausal women and older men. The calculated probabilities of fracture have been shown to be more accurate when computed with the TBS. INTERVAL CHANGE -There has been a 10.2% increase in lumbar spine BMD (I highly suspect this is overestimated due to the amount of degeneration in lumbar spine which can artificially increase BMD values). -There has been a 1.7% decrease in bilateral hip BMD. FRACTURE RISK -The FRAX risk calculator is not applicable due to osteoporosis. RECOMMENDATIONS The patient's BMD is consistent with osteoporosis, and he/she is at increased fracture risk. If not currently being treated for low BMD, this would merit treatment according to the Bone Health and Osteoporosis Foundation.     IMPRESSION: OSTEOPOROSIS. T-score meets the WHO criteria for osteoporosis at one or more measured sites (left femoral neck). The risk of osteoporotic fracture increases approximately two-fold for each standard deviation decrease in T-score.     The longitudinal plan of care for the diagnosis(es)/condition(s) as documented were addressed during this visit. Due to the added complexity in care, I will  continue to support Debra in the subsequent management and with ongoing continuity of care.      Signed by: PILY Cabrera CNP    Again, thank you for allowing me to participate in the care of your patient.        Sincerely,        PILY Cabrera CNP    Electronically signed

## 2025-05-08 NOTE — PROGRESS NOTES
History  Chief Complaint   Patient presents with    Weakness - Generalized     To ED for blood work per patient  States that his infectious disease doctor wants blood work  No fever or chills, no diarrhea  30-year-old male presents for evaluation of generalized weakness  The patient is 6 weeks status post cervical fusion complicated by MRSA infection which the patient is on home IV vanco for  The patient has fatigue without fevers or chills  Patient denies any nausea vomiting or diarrhea  The patient has had a mildly decreased appetite  The patient called his infectious disease doctor who advised him to go to the emergency department for further evaluation  Prior to Admission Medications   Prescriptions Last Dose Informant Patient Reported? Taking?    DULoxetine (CYMBALTA) 60 mg delayed release capsule  Self No No   Sig: TAKE 1 CAPSULE BY MOUTH  DAILY   acetaminophen (TYLENOL) 325 mg tablet   No No   Sig: Take 3 tablets (975 mg total) by mouth 3 (three) times a day as needed for mild pain   amLODIPine (NORVASC) 5 mg tablet   No No   Sig: Take 1 tablet (5 mg total) by mouth daily   docusate sodium (COLACE) 100 mg capsule   No No   Sig: Take 1 capsule (100 mg total) by mouth 2 (two) times a day   Patient taking differently: Take 100 mg by mouth in the morning     flecainide (TAMBOCOR) 100 mg tablet  Self No No   Sig: TAKE 1 TABLET BY MOUTH  TWICE DAILY   gabapentin (NEURONTIN) 100 mg capsule   No No   Sig: Take 1 capsule (100 mg total) by mouth in the morning   gabapentin (NEURONTIN) 300 mg capsule   No No   Sig: Take 1 capsule (300 mg total) by mouth daily at bedtime   methocarbamol (ROBAXIN) 750 mg tablet   No No   Sig: Take 1 tablet (750 mg total) by mouth every 6 (six) hours as needed for muscle spasms   metoprolol succinate (TOPROL-XL) 100 mg 24 hr tablet   No No   Sig: TAKE 1 TABLET BY MOUTH  DAILY   potassium chloride (K-DUR,KLOR-CON) 20 mEq tablet   No No   Sig: Take 2 tablets (40 mEq total) [facility] Hematology and Oncology Progress Note    Patient: Debra Mckeon  MRN: [id number]  Date of Service: May 8, 2025          Reason for Visit    Chief Complaint   Patient presents with    Oncology Clinic Visit     1 year follow up labs and dexa       Assessment and Plan     Cancer Staging   Malignant neoplasm of lower-inner quadrant of left breast in female, estrogen receptor positive (H)  Staging form: Breast, AJCC 7th Edition  - Pathologic stage from 11/18/2016: Stage IIB (T2, N1a, cM0) - Signed by Porsche Gary APRN CNP on 9/8/2021  ER Status: Positive  ID Status: Positive  HER2 Status: Negative      1. Breast cancer, stage IIB: pt was been on hormonal therapy from February 2017 until 2022.  Patient has not had any signs of recurrence since that time.  She was worried about radiation exposure with mammogram so actually started doing thermal imaging instead.  States that she has been doing that every year and that has been looking fine.  Clinically she has no signs of disease recurrence.  Patient would like to continue following up.  She will see Dr. King in 1 year.  I did tell her we will likely follow her for up to 10 years.     2. Osteoporosis: Patient states that she is careful about walking.  She has not had any falls or fractures over the last year or 2.  She does try to get calcium and vitamin D.  She is active.  Her latest bone density scan is overall stable.  It does show that her lumbar spine is 10% better but that likely is due to some artifact from arthritis.  No change to her current regimen.    ECOG Performance    0 - Independent    Distress Screening (within last 30 days)    1. How concerned are you about your ability to eat? : 0  2. How concerned are you about unintended weight loss or your current weight? : 0  3. How concerned are you about feeling depressed or very sad? : 0  4. How concerned are you about feeling anxious or very scared? : 0  5. Do you struggle with the  by mouth daily   pravastatin (PRAVACHOL) 40 mg tablet   No No   Sig: TAKE 1 TABLET BY MOUTH  DAILY   tamsulosin (FLOMAX) 0 4 mg  Self No No   Sig: TAKE 1 CAPSULE BY MOUTH  DAILY WITH DINNER   warfarin (COUMADIN) 5 mg tablet   Yes No   Sig: Take by mouth daily Take 2 5 mg (1/2 tablets) daily except Wednesday and Saturday take 5 mg daily  Facility-Administered Medications: None       Past Medical History:   Diagnosis Date    Acute venous embolism and thrombosis of deep vessels of proximal lower extremity (HCC)     Last assessed: 5/18/15    Arthritis     Cellulitis     LE    CPAP (continuous positive airway pressure) dependence     [facility] Leiden ([facility] [address] )     Forgetfulness     [place] (hard of hearing)     Paroxysmal atrial fibrillation ([facility] [address] )     Last assessed: 11/2/15    Sleep apnea        Past Surgical History:   Procedure Laterality Date    BACK SURGERY      Lower    COLON SURGERY      COLONOSCOPY      INCISION AND DRAINAGE POSTERIOR SPINE N/A 4/1/2022    Procedure: Posterior cervical evacuation of postoperative collection and debridement with placement of drains C3-T1; Surgeon: Sangeeta Guerrier MD;  Location: [facility];  Service: Neurosurgery    [place] ARTHRODESIS ANT INTERBODY MIN DISCECTOMY, CERVICAL BELOW C2 N/A 3/11/2022    Procedure: Anterior cervical discectomy and fixation fusion C5/6 and C6/7; Posterior cervical decompression and instrumented fusion C3-T1; Surgeon: Sangeeta Guerrier MD;  Location[facility];  Service: Neurosurgery       Family History   Problem Relation Age of Onset    Lung cancer Mother     No Known Problems Father     Heart attack Brother     Atrial fibrillation Brother     Emphysema Sister      I have reviewed and agree with the history as documented      E-Cigarette/Vaping    E-Cigarette Use Never User      E-Cigarette/Vaping Substances     Social History     Tobacco Use    Smoking status: Never Smoker    Smokeless tobacco: Never Used   Vaping Use    loss of meaning and [place] in your life? : Not at all  6. How concerned are you about work and home life issues that may be affected by your cancer? : 0  7. How concerned are you about knowing what resources are available to help you? : 0  8. Do you currently have what you would describe as [employer]ism or spiritual struggles?            : Not at all       Pain  Pain Score: No Pain (0)    Problem List    Patient Active Problem List   Diagnosis    Osteopenia    Menopause Has Occurred    Malignant neoplasm of lower-inner quadrant of left breast in female, estrogen receptor positive (H)    Herpes zoster without complication    Age-related osteoporosis without current pathological fracture        ______________________________________________________________________________    History of Present Illness    Debra Mckeon is a 80 y.o. female breast cancer located in her left breast measuring 2.2 cm in size presenting on the CT scan that was done to look for pulmonary embolism back in June 2016 when she presented with pleuritic chest pain. She was then seen by Dr. Larios who ordered an Onco blot test which apparently was positive. She then had another mammogram in November 2016 which was also suggestive of malignancy in the left breast. She underwent biopsy on November 18, 2016 which confirmed breast cancer ER positive RI positive HER-2/khari negative. Subsequent to that she was seen by Dr. Powers who performed lumpectomy with sentinel lymph node biopsy on December 8, 2016 which showed invasive ductal carcinoma 2.2 cm in size negative margins with one of the 2 sentinel lymph nodes positive for metastatic cancer the size in the lymph node was 9 mm. She recovered well from surgery.     We did Oncotype and it came back low at 6. So she has proceeded with radiation that was finished in Feb 17, 2017. Started on Anastrozole in February 2017.  Completed that in February 2022.  She is been off medication now and really has been doing  "quite well.  She denies any changes in her breast.  Denies any unexplained weight loss.  Denies any new bone or back pain issues.       Review of Systems    Pertinent items are noted in HPI.    Past History    Past Medical History:   Diagnosis Date    Breast cancer (H) 2016    Celiac disease 2008    Disease of thyroid gland     Hx of radiation therapy 01/2017    Hypothyroid     Melanoma (H) 2012    on nose    Shingles     Squamous cell skin cancer, wrist, left 2015       PHYSICAL EXAM:  /69 (BP Location: Right arm, Patient Position: Sitting, Cuff Size: Adult Regular)   Pulse 68   Temp 97.4  F (36.3  C) (Oral)   Resp 16   Ht 1.676 m (5' 6\")   Wt 59.8 kg (131 lb 14.4 oz)   SpO2 98%   BMI 21.29 kg/m    GENERAL: no acute distress. Cooperative in conversation. Here alone  MUSCULOSKELETAL: No lower extremity swelling.   NEURO: non focal. Alert and oriented x3.   PSYCH: within normal limits. No depression or anxiety.  SKIN: warm dry intact   LYMPH: no cervical, supraclavicular or axillary lymphadenopathy  BREAST: Bilateral exam done.  Lumpectomy incision is well-healed.  Very slight scarring.  No abnormal lesions or rashes noted.  No evidence for local recurrence bilaterally.        Lab Results    Recent Results (from the past week)   Comprehensive metabolic panel (BMP + Alb, Alk Phos, ALT, AST, Total. Bili, TP)   Result Value Ref Range    Sodium 142 135 - 145 mmol/L    Potassium 4.6 3.4 - 5.3 mmol/L    Carbon Dioxide (CO2) 30 (H) 22 - 29 mmol/L    Anion Gap 8 7 - 15 mmol/L    Urea Nitrogen 12.9 8.0 - 23.0 mg/dL    Creatinine 0.72 0.51 - 0.95 mg/dL    GFR Estimate 85 >60 mL/min/1.73m2    Calcium 9.0 8.8 - 10.4 mg/dL    Chloride 104 98 - 107 mmol/L    Glucose 113 (H) 70 - 99 mg/dL    Alkaline Phosphatase 79 40 - 150 U/L    AST 26 0 - 45 U/L    ALT 19 0 - 50 U/L    Protein Total 6.7 6.4 - 8.3 g/dL    Albumin 4.3 3.5 - 5.2 g/dL    Bilirubin Total 0.7 <=1.2 mg/dL       Imaging    DX Bone Density  Result Date: " Vaping Use: Never used   Substance Use Topics    Alcohol use: Not Currently    Drug use: No       Review of Systems   Constitutional: Positive for fatigue  Negative for chills and fever  HENT: Negative for sore throat  Respiratory: Negative for cough, chest tightness and shortness of breath  Cardiovascular: Positive for leg swelling  Negative for chest pain and palpitations  Gastrointestinal: Negative for abdominal pain, constipation, diarrhea, nausea and vomiting  Genitourinary: Negative for difficulty urinating and dysuria  Musculoskeletal: Negative for back pain  Skin: Negative for rash  Neurological: Positive for weakness  Negative for dizziness, seizures, syncope and headaches  All other systems reviewed and are negative  Physical Exam  Physical Exam  Vitals and nursing note reviewed  Constitutional:       General: He is not in acute distress  Appearance: He is well-developed  HENT:      Head: Normocephalic and atraumatic  Right Ear: External ear normal       Left Ear: External ear normal       Mouth/Throat:      Pharynx: No oropharyngeal exudate  Eyes:      General: No scleral icterus  Pupils: Pupils are equal, round, and reactive to light  Cardiovascular:      Rate and Rhythm: Normal rate and regular rhythm  Heart sounds: Murmur heard  Pulmonary:      Effort: Pulmonary effort is normal  No respiratory distress  Breath sounds: Normal breath sounds  Abdominal:      General: Bowel sounds are normal       Palpations: Abdomen is soft  Tenderness: There is no abdominal tenderness  There is no guarding or rebound  Musculoskeletal:         General: Normal range of motion  Cervical back: Normal range of motion and neck supple  Right lower leg: Edema present  Left lower leg: Edema present  Skin:     General: Skin is warm and dry  Findings: No rash     Neurological:      Mental Status: He is alert and oriented to person, place, 5/1/2025  EXAM: DX TBS AXIAL and PERIPHERAL LOCATION: [facility] DATE: 4/30/2025 INDICATION: BMD Screening. Follow up. History of fracture in adult life. DEMOGRAPHICS: Age- 80 years. Gender- Female. Menopausal status- Postmenopausal. COMPARISON: 3/28/2023 TECHNIQUE: Dual-energy x-ray absorptiometry (DXA) performed with routine technique. Forearm DXA performed.  Trabecular bone score (TBS) analysis performed. FINDINGS: DXA RESULTS -Lumbar Spine: L1-L4: BMD: 1.146 g/cm2. T-score: -0.3. Z-score: 1.5. Degenerative change may artifactually increase BMD. -RIGHT Hip Total: BMD: 0.745 g/cm2. T-score: -2.1. Z-score: -0.1. -RIGHT Hip Femoral neck: BMD: 0.724 g/cm2. T-score: -2.3. Z-score: -0.2. -LEFT Hip Total: BMD: 0.728 g/cm2. T-score: -2.2. Z-score: -0.3. -LEFT Hip Femoral neck: BMD: 0.684 g/cm2. T-score: -2.5. Z-score: -0.4. -LEFT Radius 33%: BMD: 0.545 g/cm2. T-score: -3.8. Z-score: -1.1. WHO T-SCORE CRITERIA -Normal: T score at or above -1 SD -Osteopenia: T score between -1 and -2.5 SD -Osteoporosis: T score at or below -2.5 SD The World Health Organization (WHO) criteria is applicable to perimenopausal females, postmenopausal females, and men aged 50 years or older. TBS RESULTS -Lumbar Spine L1-L4: TBS: 1.293. TBS T-score: -1.9.TBS Z-score: 0.8. The TBS is a DXA derived measurement for fracture risk assessment, and reflects the structural condition of the bone microarchitecture. It can be used to adjust WHO Fracture Risk Assessment Tool (FRAX) probability of fracture in postmenopausal women and older men. The calculated probabilities of fracture have been shown to be more accurate when computed with the TBS. INTERVAL CHANGE -There has been a 10.2% increase in lumbar spine BMD (I highly suspect this is overestimated due to the amount of degeneration in lumbar spine which can artificially increase BMD values). -There has been a 1.7% decrease in bilateral hip BMD. FRACTURE RISK -The FRAX risk  and time           Vital Signs  ED Triage Vitals [04/22/22 1427]   Temperature Pulse Respirations Blood Pressure SpO2   (!) 97 3 °F (36 3 °C) 60 20 (!) 197/86 98 %      Temp Source Heart Rate Source Patient Position - Orthostatic VS BP Location FiO2 (%)   Temporal Monitor Sitting Left arm --      Pain Score       No Pain           Vitals:    04/22/22 2240 04/23/22 0022 04/23/22 0611 04/23/22 0738   BP: 168/89 150/66 165/85 160/83   Pulse: 62 62 65 58   Patient Position - Orthostatic VS:             Visual Acuity      ED Medications  Medications   docusate sodium (COLACE) capsule 100 mg (100 mg Oral Given 4/23/22 0816)   DULoxetine (CYMBALTA) delayed release capsule 60 mg (60 mg Oral Given 4/23/22 0816)   flecainide (TAMBOCOR) tablet 100 mg (100 mg Oral Given 4/23/22 0816)   gabapentin (NEURONTIN) capsule 100 mg (100 mg Oral Given 4/23/22 0817)   gabapentin (NEURONTIN) capsule 300 mg (300 mg Oral Given 4/22/22 2140)   methocarbamol (ROBAXIN) tablet 750 mg (has no administration in time range)   oxyCODONE (ROXICODONE) IR tablet 5 mg (has no administration in time range)   potassium chloride (K-DUR,KLOR-CON) CR tablet 40 mEq (40 mEq Oral Given 4/23/22 0816)   pravastatin (PRAVACHOL) tablet 40 mg (40 mg Oral Refused 4/22/22 1814)   tamsulosin (FLOMAX) capsule 0 4 mg (0 4 mg Oral Given 4/22/22 1815)   ondansetron (ZOFRAN) injection 4 mg (4 mg Intravenous Given 4/22/22 2139)   polyethylene glycol (MIRALAX) packet 17 g (has no administration in time range)   aluminum-magnesium hydroxide-simethicone (MYLANTA) oral suspension 30 mL (has no administration in time range)   albumin human (FLEXBUMIN) 25 % injection 25 g (25 g Intravenous New Bag 4/23/22 0816)   pantoprazole (PROTONIX) injection 40 mg (40 mg Intravenous Given 4/23/22 0816)   acetaminophen (TYLENOL) tablet 650 mg (650 mg Oral Given 4/23/22 0627)   vancomycin (VANCOCIN) IVPB (premix in dextrose) 1,000 mg 200 mL (has no administration in time range)   amLODIPine (NORVASC) tablet 5 mg (has no administration in time range)   metoprolol succinate (TOPROL-XL) 24 hr tablet 100 mg (has no administration in time range)       Diagnostic Studies  Results Reviewed     Procedure Component Value Units Date/Time    Basic metabolic panel [[id number]]  (Abnormal) Collected: 04/23/22 0617    Lab Status: Final result Specimen: Blood from Arm, Right Updated: 04/23/22 0643     Sodium 141 mmol/L      Potassium 3 9 mmol/L      Chloride 107 mmol/L      CO2 27 mmol/L      ANION GAP 7 mmol/L      BUN 30 mg/dL      Creatinine 2 79 mg/dL      Glucose 93 mg/dL      Calcium 8 2 mg/dL      eGFR 21 ml/min/1 73sq m     Narrative:      National Kidney Disease Foundation guidelines for Chronic Kidney Disease (CKD):     Stage 1 with normal or high GFR (GFR > 90 mL/min/1 73 square meters)    Stage 2 Mild CKD (GFR = 60-89 mL/min/1 73 square meters)    Stage 3A Moderate CKD (GFR = 45-59 mL/min/1 73 square meters)    Stage 3B Moderate CKD (GFR = 30-44 mL/min/1 73 square meters)    Stage 4 Severe CKD (GFR = 15-29 mL/min/1 73 square meters)    Stage 5 End Stage CKD (GFR <15 mL/min/1 73 square meters)  Note: GFR calculation is accurate only with a steady state creatinine    CBC and differential [[id number]]  (Abnormal) Collected: 04/23/22 0617    Lab Status: Final result Specimen: Blood from Arm, Right Updated: 04/23/22 0627     WBC 5 11 Thousand/uL      RBC 2 81 Million/uL      Hemoglobin 8 2 g/dL      Hematocrit 26 1 %      MCV 93 fL      MCH 29 2 pg      MCHC 31 4 g/dL      RDW 14 9 %      MPV 9 1 fL      Platelets 040 Thousands/uL      nRBC 0 /100 WBCs      Neutrophils Relative 55 %      Immat GRANS % 0 %      Lymphocytes Relative 26 %      Monocytes Relative 11 %      Eosinophils Relative 5 %      Basophils Relative 3 %      Neutrophils Absolute 2 78 Thousands/µL      Immature Grans Absolute 0 01 Thousand/uL      Lymphocytes Absolute 1 34 Thousands/µL      Monocytes Absolute 0 58 Thousand/µL calculator is not applicable due to osteoporosis. RECOMMENDATIONS The patient's BMD is consistent with osteoporosis, and he/she is at increased fracture risk. If not currently being treated for low BMD, this would merit treatment according to the Bone Health and Osteoporosis Foundation.     IMPRESSION: OSTEOPOROSIS. T-score meets the WHO criteria for osteoporosis at one or more measured sites (left femoral neck). The risk of osteoporotic fracture increases approximately two-fold for each standard deviation decrease in T-score.     The longitudinal plan of care for the diagnosis(es)/condition(s) as documented were addressed during this visit. Due to the added complexity in care, I will continue to support Debra in the subsequent management and with ongoing continuity of care.      Signed by: PILY Cabrera CNP   Eosinophils Absolute 0 27 Thousand/µL      Basophils Absolute 0 13 Thousands/µL     Folate [[id number]]  (Normal) Collected: 04/22/22 1522    Lab Status: Final result Specimen: Blood from Central Venous Line Updated: 04/23/22 0601     Folate 10 4 ng/mL     Iron Saturation % [[id number]]  (Abnormal) Collected: 04/22/22 1522    Lab Status: Final result Specimen: Blood from Central Venous Line Updated: 04/23/22 0601     Iron Saturation 20 %      TIBC 204 ug/dL      Iron 41 ug/dL     Ferritin [[id number]]  (Abnormal) Collected: 04/22/22 1522    Lab Status: Final result Specimen: Blood from Central Venous Line Updated: 04/23/22 0601     Ferritin 521 ng/mL     Vitamin B12 [[id number]]  (Normal) Collected: 04/22/22 1522    Lab Status: Final result Specimen: Blood from Central Venous Line Updated: 04/23/22 0601     Vitamin B-12 329 pg/mL     Albumin [[id number]]  (Abnormal) Collected: 04/22/22 1522    Lab Status: Final result Specimen: Blood from Central Venous Line Updated: 04/22/22 2122     Albumin 1 9 g/dL     TSH, 3rd generation [[id number]]  (Normal) Collected: 04/22/22 1953    Lab Status: Final result Specimen: Blood from Arm, Right Updated: 04/22/22 2101     TSH 3RD GENERATON 2 963 uIU/mL     Narrative:      Patients undergoing fluorescein dye angiography may retain small amounts of fluorescein in the body for 48-72 hours post procedure  Samples containing fluorescein can produce falsely depressed TSH values  If the patient had this procedure,a specimen should be resubmitted post fluorescein clearance        HS Troponin I 4hr [[id number]]  (Normal) Collected: 04/22/22 1953    Lab Status: Final result Specimen: Blood from Arm, Right Updated: 04/22/22 2057     hs TnI 4hr 7 ng/L      Delta 4hr hsTnI 0 ng/L     STAT INR [[id number]]  (Abnormal) Collected: 04/22/22 1953    Lab Status: Final result Specimen: Blood from Arm, Right Updated: 04/22/22 2048     Protime 21 8 seconds      INR 1 95    UA (URINE) with reflex to Scope [[id number]]  (Abnormal) Collected: 04/22/22 1915    Lab Status: Final result Specimen: Urine, Clean Catch Updated: 04/22/22 2024     Color, UA Pink     Clarity, UA Slightly Cloudy     Specific [place], UA 1 020     pH, UA 7 0     Leukocytes, UA Negative     Nitrite, UA Negative     Protein, UA >=300 mg/dl      Glucose, UA Negative mg/dl      Ketones, UA Negative mg/dl      Urobilinogen, UA 0 2 E U /dl      Bilirubin, UA Negative     Blood, UA Large    CBC and differential [[id number]]  (Abnormal) Collected: 04/22/22 1522    Lab Status: Final result Specimen: Blood from Central Venous Line Updated: 04/22/22 1636     WBC 6 34 Thousand/uL      RBC 2 19 Million/uL      Hemoglobin 6 6 g/dL      Hematocrit 20 9 %      MCV 95 fL      MCH 30 1 pg      MCHC 31 6 g/dL      RDW 14 0 %      MPV 9 9 fL      Platelets 431 Thousands/uL      nRBC 0 /100 WBCs      Neutrophils Relative 58 %      Immat GRANS % 1 %      Lymphocytes Relative 22 %      Monocytes Relative 13 %      Eosinophils Relative 4 %      Basophils Relative 2 %      Neutrophils Absolute 3 76 Thousands/µL      Immature Grans Absolute 0 03 Thousand/uL      Lymphocytes Absolute 1 38 Thousands/µL      Monocytes Absolute 0 79 Thousand/µL      Eosinophils Absolute 0 26 Thousand/µL      Basophils Absolute 0 12 Thousands/µL     Narrative:      No Clots    HS Troponin 0hr (reflex protocol) [[id number]]  (Normal) Collected: 04/22/22 1522    Lab Status: Final result Specimen: Blood from Central Venous Line Updated: 04/22/22 1618     hs TnI 0hr 7 ng/L     HS Troponin I 2hr [[id number]]     Lab Status: No result Specimen: Blood     Vancomycin, trough [[id number]]  (Abnormal) Collected: 04/22/22 1522    Lab Status: Final result Specimen: Blood from Central Venous Line Updated: 04/22/22 1613     Vancomycin Tr 27 9 ug/mL     Narrative:      Verify that this specimen was collected immediately prior to drug administration      Reference range and result flagging reflect proper specimen collection protocol  NT-BNP PRO [[id number]]  (Abnormal) Collected: 04/22/22 1522    Lab Status: Final result Specimen: Blood from Central Venous Line Updated: 04/22/22 1558     NT-proBNP 3,948 pg/mL     Basic metabolic panel [[id number]]  (Abnormal) Collected: 04/22/22 1522    Lab Status: Final result Specimen: Blood from Central Venous Line Updated: 04/22/22 1545     Sodium 140 mmol/L      Potassium 4 2 mmol/L      Chloride 105 mmol/L      CO2 27 mmol/L      ANION GAP 8 mmol/L      BUN 30 mg/dL      Creatinine 2 65 mg/dL      Glucose 107 mg/dL      Calcium 8 2 mg/dL      eGFR 23 ml/min/1 73sq m     Narrative:      [place] guidelines for Chronic Kidney Disease (CKD):     Stage 1 with normal or high GFR (GFR > 90 mL/min/1 73 square meters)    Stage 2 Mild CKD (GFR = 60-89 mL/min/1 73 square meters)    Stage 3A Moderate CKD (GFR = 45-59 mL/min/1 73 square meters)    Stage 3B Moderate CKD (GFR = 30-44 mL/min/1 73 square meters)    Stage 4 Severe CKD (GFR = 15-29 mL/min/1 73 square meters)    Stage 5 End Stage CKD (GFR <15 mL/min/1 73 square meters)  Note: GFR calculation is accurate only with a steady state creatinine                 XR chest 1 view portable   ED Interpretation by Saman Mayes DO (04/22 1537)   ED Provider X-ray Interpretation    My X-ray interpretation of the Chest: was negative for infiltrate, effusion, pneumothorax, or wide mediastinum    Saman Mayes DO      Final Result by Gaby Mosley MD (04/22 2039)   Addendum 1 of 1 by Gaby Mosley MD (04/22 2039)   ADDENDUM:      PICC line tip projects over the region of the mid SVC  Final      No acute cardiopulmonary disease  Workstation performed: [id number]                    Procedures  ECG 12 Lead Documentation Only    Date/Time: 4/22/2022 3:46 PM  Performed by: Saman Mayes DO  Authorized by:  Saman Mayes DO     Indications / Diagnosis:  Fatigue  ECG reviewed by me, the ED Provider: yes    Patient location:  ED  Previous ECG:     Previous ECG:  Compared to current    Comparison ECG info:  3/10/2020    Similarity:  No change  Interpretation:     Interpretation: abnormal    Rate:     ECG rate:  57    ECG rate assessment: bradycardic    Rhythm:     Rhythm: sinus tachycardia    Ectopy:     Ectopy: none    QRS:     QRS axis:  Normal    QRS intervals:  Normal  Conduction:     Conduction: abnormal      Abnormal conduction: complete RBBB and 1st degree    ST segments:     ST segments:  Normal  T waves:     T waves: normal               ED Course  ED Course as of 04/23/22 1240   Fri Apr 22, 2022   1551 Creatinine(!): 2 65  Creatinine significantly elevated from last 4 days ago 1 88 which was elevated from previous a week prior to that at 1 1             HEART Risk Score      Most Recent Value   Heart Score Risk Calculator    History 0 Filed at: 04/22/2022 1638   ECG 1 Filed at: 04/22/2022 1638   Age 2 Filed at: 04/22/2022 1638   Risk Factors 1 Filed at: 04/22/2022 1638   Troponin 0 Filed at: 04/22/2022 1638   HEART Score 4 Filed at: 04/22/2022 1638                                      MDM  Number of Diagnoses or Management Options  Acute renal insufficiency  Anemia  Pedal edema  Weakness  Diagnosis management comments: DDx: supratherapuetic vanco, renal failure, dehydration, anemia, electrolyte d/o, HF, MI, PNA, other    Check EKG, labs, CXR    Plan for admit       Amount and/or Complexity of Data Reviewed  Clinical lab tests: reviewed  Decide to obtain previous medical records or to obtain history from someone other than the patient: yes  Review and summarize past medical records: yes (ID notes reviewed)  Discuss the patient with other providers: yes        Disposition  Final diagnoses:   Weakness   Acute renal insufficiency   Anemia   Pedal edema     Time reflects when diagnosis was documented in both MDM as applicable and the Disposition within this note     Time User Action Codes Description Comment    4/22/2022  4:37 PM Jerri Vineson Add [R53 1] Weakness     4/22/2022  4:37 PM Marvelyn Pea B Add [N28 9] Acute renal insufficiency     4/22/2022  4:37 PM Jerri Noon Add [D64 9] Anemia     4/22/2022  4:38 PM Marvelyn Pea B Add [R60 0] Pedal edema     4/23/2022  7:55 AM Oleksandr-Luc Rosa Kayden L Add [N17 9] JANEL (acute kidney injury) ([facility] [address] )     4/23/2022 10:12 AM Oleksandr-Darius Rosa [place] Add [Z98 890] Status post catheter ablation of atrial flutter     4/23/2022 10:12 AM Oleksandr-MatikaRafaelLuc Kayden L Add [Z98 1] Status post cervical spinal fusion     4/23/2022 10:12 AM Oleksandr-Darius Roas [place] Add [T81 49XA] Surgical site infection       ED Disposition     ED Disposition Condition Date/Time Comment    Admit Stable Fri Apr 22, 2022  4:37 PM Case was discussed with Gayathri Dey and the patient's admission status was agreed to be Admission Status: inpatient status to the service of Dr Gayathri Dey   Follow-up Information    None         Current Discharge Medication List      CONTINUE these medications which have NOT CHANGED    Details   acetaminophen (TYLENOL) 325 mg tablet Take 3 tablets (975 mg total) by mouth 3 (three) times a day as needed for mild pain  Refills: 0    Associated Diagnoses: Post-operative state      amLODIPine (NORVASC) 5 mg tablet Take 1 tablet (5 mg total) by mouth daily  Qty: 30 tablet, Refills: 0    Associated Diagnoses: Essential hypertension      docusate sodium (COLACE) 100 mg capsule Take 1 capsule (100 mg total) by mouth 2 (two) times a day  Qty: 60 capsule, Refills: 0    Associated Diagnoses:  At risk for constipation      DULoxetine (CYMBALTA) 60 mg delayed release capsule TAKE 1 CAPSULE BY MOUTH  DAILY  Qty: 90 capsule, Refills: 1    Associated Diagnoses: Depression, unspecified depression type      flecainide (TAMBOCOR) 100 mg tablet TAKE 1 TABLET BY MOUTH  TWICE DAILY  Qty: 180 tablet, Refills: 1    Associated Diagnoses: Atrial fibrillation, unspecified type ([facility] )      !! gabapentin (NEURONTIN) 100 mg capsule Take 1 capsule (100 mg total) by mouth in the morning  Qty: 30 capsule, Refills: 0    Associated Diagnoses: Pain; Head pain cephalgia      !! gabapentin (NEURONTIN) 300 mg capsule Take 1 capsule (300 mg total) by mouth daily at bedtime  Qty: 30 capsule, Refills: 0    Associated Diagnoses: Pain; Head pain cephalgia      methocarbamol (ROBAXIN) 750 mg tablet Take 1 tablet (750 mg total) by mouth every 6 (six) hours as needed for muscle spasms  Qty: 30 tablet, Refills: 0    Associated Diagnoses: Post-operative state      metoprolol succinate (TOPROL-XL) 100 mg 24 hr tablet TAKE 1 TABLET BY MOUTH  DAILY  Qty: 90 tablet, Refills: 0    Associated Diagnoses: Atrial fibrillation, unspecified type ([facility] ); Essential hypertension      potassium chloride (K-DUR,KLOR-CON) 20 mEq tablet Take 2 tablets (40 mEq total) by mouth daily  Qty: 14 tablet, Refills: 0    Associated Diagnoses: Hypokalemia      pravastatin (PRAVACHOL) 40 mg tablet TAKE 1 TABLET BY MOUTH  DAILY  Qty: 90 tablet, Refills: 1    Associated Diagnoses: Dyslipidemia      tamsulosin (FLOMAX) 0 4 mg TAKE 1 CAPSULE BY MOUTH  DAILY WITH DINNER  Qty: 90 capsule, Refills: 1    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 - Potential duplicate medications found  Please discuss with provider  STOP taking these medications       oxyCODONE (ROXICODONE) 5 immediate release tablet Comments:   Reason for Stopping:               No discharge procedures on file      PDMP Review       Value Time User    PDMP Reviewed  Yes 4/15/2022 11:03 AM Joceline Skinner PA-C          ED Provider  Electronically Signed by           Megan Tan DO  04/23/22 2754